# Patient Record
Sex: FEMALE | Race: WHITE | NOT HISPANIC OR LATINO | Employment: FULL TIME | URBAN - METROPOLITAN AREA
[De-identification: names, ages, dates, MRNs, and addresses within clinical notes are randomized per-mention and may not be internally consistent; named-entity substitution may affect disease eponyms.]

---

## 2017-07-11 ENCOUNTER — OFFICE VISIT (OUTPATIENT)
Dept: OBSTETRICS AND GYNECOLOGY | Facility: CLINIC | Age: 62
End: 2017-07-11
Payer: COMMERCIAL

## 2017-07-11 VITALS
SYSTOLIC BLOOD PRESSURE: 102 MMHG | DIASTOLIC BLOOD PRESSURE: 60 MMHG | HEIGHT: 61 IN | BODY MASS INDEX: 20.11 KG/M2 | WEIGHT: 106.5 LBS

## 2017-07-11 DIAGNOSIS — R87.615 UNSATISFACTORY CERVICAL CYTOLOGY SMEAR: ICD-10-CM

## 2017-07-11 DIAGNOSIS — Z01.419 WELL FEMALE EXAM WITH ROUTINE GYNECOLOGICAL EXAM: Primary | ICD-10-CM

## 2017-07-11 DIAGNOSIS — Z12.31 VISIT FOR SCREENING MAMMOGRAM: ICD-10-CM

## 2017-07-11 DIAGNOSIS — Z11.51 SCREENING FOR HUMAN PAPILLOMAVIRUS: ICD-10-CM

## 2017-07-11 PROCEDURE — 99999 PR PBB SHADOW E&M-EST. PATIENT-LVL III: CPT | Mod: PBBFAC,,, | Performed by: OBSTETRICS & GYNECOLOGY

## 2017-07-11 PROCEDURE — 88142 CYTOPATH C/V THIN LAYER: CPT

## 2017-07-11 PROCEDURE — 99396 PREV VISIT EST AGE 40-64: CPT | Mod: S$GLB,,, | Performed by: OBSTETRICS & GYNECOLOGY

## 2017-07-11 PROCEDURE — 87624 HPV HI-RISK TYP POOLED RSLT: CPT

## 2017-07-11 NOTE — PROGRESS NOTES
"CC: Well woman exam    Nika Smith is a 62 y.o. female  presents for a well woman exam.  She is established.      HPI:  62 year old here for WWE.  Patient denies vaginal bleeding, breast concerns, no pelvic pain.   Repeat pap today.     Past Medical History:   Diagnosis Date    Abnormal Pap smear of cervix     ASCUS favor reactive, repeat nl    History of osteopenia     hip    Insomnia     Menopause        Past Surgical History:   Procedure Laterality Date    DILATION AND CURETTAGE OF UTERUS      menorrhagia, path benign       OB History    Para Term  AB Living   2 2       2   SAB TAB Ectopic Multiple Live Births           2      # Outcome Date GA Lbr Jordi/2nd Weight Sex Delivery Anes PTL Lv   2 Para     M Vag-Spont   FRANCY   1 Para    3.544 kg (7 lb 13 oz) M Vag-Spont   FRANCY          Family History   Problem Relation Age of Onset    Diabetes Father     Hypertension Father     Heart attack Father     Coronary artery disease Father     Breast cancer Sister 65       Social History   Substance Use Topics    Smoking status: Former Smoker    Smokeless tobacco: Former User    Alcohol use Yes       /60   Ht 5' 0.5" (1.537 m)   Wt 48.3 kg (106 lb 7.7 oz)   LMP  (LMP Unknown)   BMI 20.45 kg/m²     ROS:  Review of Systems   Constitutional: Negative for fatigue.   Respiratory: Negative for shortness of breath.    Cardiovascular: Negative for chest pain.   Gastrointestinal: Negative for abdominal pain, constipation, diarrhea and nausea.   Genitourinary: Negative for dyspareunia, dysuria, menstrual problem, pelvic pain and vaginal bleeding.   Musculoskeletal: Negative for back pain.   Skin: Negative for rash.   Neurological: Negative for headaches.   Psychiatric/Behavioral: Negative for dysphoric mood. The patient is not nervous/anxious.        Physical Exam:  Physical Exam:   Constitutional: She is oriented to person, place, and time. She appears well-developed and " well-nourished.      Neck: No thyromegaly present.    Cardiovascular: Normal rate.     Pulmonary/Chest: Effort normal and breath sounds normal. Right breast exhibits no mass, no nipple discharge, no skin change, no tenderness and no bleeding. Left breast exhibits no mass, no nipple discharge, no skin change, no tenderness and no bleeding.        Abdominal: Soft. Normal appearance and bowel sounds are normal. She exhibits no distension and no mass. There is no tenderness. There is no rebound and no guarding.     Genitourinary: Vagina normal and uterus normal. Pelvic exam was performed with patient supine. There is no rash, tenderness, lesion or injury on the right labia. There is no rash, tenderness, lesion or injury on the left labia. Uterus is not enlarged, not fixed and not tender. Cervix is normal. Right adnexum displays no mass, no tenderness and no fullness. Left adnexum displays no mass, no tenderness and no fullness. No erythema, tenderness, rectocele, cystocele or unspecified prolapse of vaginal walls in the vagina. No signs of injury around the vagina. No vaginal discharge found. Cervix exhibits no motion tenderness, no discharge and no friability.           Musculoskeletal: Normal range of motion and moves all extremeties.      Lymphadenopathy:     She has no axillary adenopathy.        Right: No supraclavicular adenopathy present.        Left: No supraclavicular adenopathy present.    Neurological: She is alert and oriented to person, place, and time.    Skin: Skin is warm and dry.    Psychiatric: She has a normal mood and affect. Her behavior is normal. Judgment normal.       ASSESSMENT AND PLAN  Well female exam with routine gynecological exam  -     Liquid-based pap smear, screening  -     HPV High Risk Genotypes, PCR    Visit for screening mammogram  -     Mammo Digital Screening Bilat with Tomosynthesis CAD; Future; Expected date: 07/11/2017    Screening for human papillomavirus  -     HPV High Risk  Genotypes, PCR    Unsatisfactory cervical cytology smear  -     HPV High Risk Genotypes, PCR        Patient was counseled today on A.C.S. Pap guidelines and recommendations for yearly pelvic exams, mammograms and monthly self breast exams; to see her PCP for other health maintenance.     Return in about 1 year (around 7/11/2018).

## 2017-07-17 LAB
HPV HR 12 DNA CVX QL NAA+PROBE: NEGATIVE
HPV16 DNA SPEC QL NAA+PROBE: NEGATIVE
HPV18 DNA SPEC QL NAA+PROBE: NEGATIVE

## 2018-07-10 ENCOUNTER — OFFICE VISIT (OUTPATIENT)
Dept: OBSTETRICS AND GYNECOLOGY | Facility: CLINIC | Age: 63
End: 2018-07-10
Payer: MEDICARE

## 2018-07-10 VITALS
WEIGHT: 103.75 LBS | SYSTOLIC BLOOD PRESSURE: 110 MMHG | BODY MASS INDEX: 17.29 KG/M2 | DIASTOLIC BLOOD PRESSURE: 68 MMHG | HEIGHT: 65 IN

## 2018-07-10 DIAGNOSIS — M89.9 DISORDER OF BONE: ICD-10-CM

## 2018-07-10 DIAGNOSIS — Z12.31 VISIT FOR SCREENING MAMMOGRAM: Primary | ICD-10-CM

## 2018-07-10 PROCEDURE — 99212 OFFICE O/P EST SF 10 MIN: CPT | Mod: S$GLB,,, | Performed by: OBSTETRICS & GYNECOLOGY

## 2018-07-10 PROCEDURE — 3008F BODY MASS INDEX DOCD: CPT | Mod: CPTII,S$GLB,, | Performed by: OBSTETRICS & GYNECOLOGY

## 2018-07-10 PROCEDURE — 99999 PR PBB SHADOW E&M-EST. PATIENT-LVL III: CPT | Mod: PBBFAC,,, | Performed by: OBSTETRICS & GYNECOLOGY

## 2018-07-10 RX ORDER — TRAZODONE HYDROCHLORIDE 50 MG/1
TABLET ORAL
COMMUNITY
Start: 2018-06-13 | End: 2019-07-16

## 2018-07-10 RX ORDER — MINOXIDIL 50 MG/G
AEROSOL, FOAM TOPICAL
COMMUNITY
Start: 2018-07-08

## 2018-07-10 RX ORDER — ZOLPIDEM TARTRATE 10 MG/1
TABLET ORAL
COMMUNITY
Start: 2018-06-10 | End: 2019-10-21 | Stop reason: SDUPTHER

## 2018-07-16 NOTE — PROGRESS NOTES
Subjective:       Patient ID: Nika Smith is a 63 y.o. female.    Chief Complaint:  Well Woman (17 pap/hpv-negative/negative 2016-Bone Density)      History of Present Illness  63 year old here to discuss osteopenia and bone density.  Last scan 2016 with major 17% and hip1. 3% for a fracture with her osteopenia.  Due for a repeat bone density.  No breast concerns.  UTD with mammogram.  No vaginal bleeding or pelvic pain.  Occasional stress incontinence.  New pap guidelines discussed.    GYN & OB History  No LMP recorded (lmp unknown). Patient is postmenopausal.   Date of Last Pap: 7/15/2017    OB History    Para Term  AB Living   2 2       2   SAB TAB Ectopic Multiple Live Births           2      # Outcome Date GA Lbr Jordi/2nd Weight Sex Delivery Anes PTL Lv   2 Para     M Vag-Spont   FRANCY   1 Para    3.544 kg (7 lb 13 oz) M Vag-Spont   FRANCY          Past Medical History:   Diagnosis Date    Abnormal Pap smear of cervix     ASCUS favor reactive, repeat nl    History of osteopenia     hip    Insomnia     Menopause     Mitral valve prolapse        Past Surgical History:   Procedure Laterality Date    DILATION AND CURETTAGE OF UTERUS  2002    menorrhagia, path benign       Review of Systems  Review of Systems   Constitutional: Negative for fatigue.   Respiratory: Negative for shortness of breath.    Cardiovascular: Negative for chest pain.   Gastrointestinal: Negative for abdominal pain, constipation, diarrhea and nausea.   Genitourinary: Negative for dysuria, pelvic pain and vaginal bleeding.        Mild stress incontinence   Musculoskeletal: Negative for back pain.   Skin: Negative for rash.   Neurological: Negative for headaches.   Hematological: Negative for adenopathy.   Psychiatric/Behavioral: Negative for dysphoric mood. The patient is not nervous/anxious.         Objective:   Physical Exam:   Constitutional: She is oriented to person, place, and time. She appears  well-developed and well-nourished.      Neck: No thyromegaly present.    Cardiovascular: Normal rate.     Pulmonary/Chest: Effort normal and breath sounds normal. Right breast exhibits no mass, no nipple discharge, no skin change, no tenderness and no bleeding. Left breast exhibits no mass, no nipple discharge, no skin change, no tenderness and no bleeding.        Abdominal: Soft. Normal appearance and bowel sounds are normal. She exhibits no distension and no mass. There is no tenderness. There is no rebound and no guarding.     Genitourinary: Vagina normal and uterus normal. Pelvic exam was performed with patient supine. There is no rash, tenderness, lesion or injury on the right labia. There is no rash, tenderness, lesion or injury on the left labia. Uterus is not enlarged, not fixed and not tender. Cervix is normal. Right adnexum displays no mass, no tenderness and no fullness. Left adnexum displays no mass, no tenderness and no fullness. No erythema, tenderness, rectocele, cystocele or unspecified prolapse of vaginal walls in the vagina. No signs of injury around the vagina. No vaginal discharge found. Cervix exhibits no motion tenderness, no discharge and no friability.           Musculoskeletal: Normal range of motion and moves all extremeties.      Lymphadenopathy:     She has no axillary adenopathy.        Right: No supraclavicular adenopathy present.        Left: No supraclavicular adenopathy present.    Neurological: She is alert and oriented to person, place, and time.    Skin: Skin is warm and dry.    Psychiatric: She has a normal mood and affect. Her behavior is normal. Judgment normal.        Assessment/ Plan:     Visit for screening mammogram  -     Mammo Digital Screening Bilat with Tomosynthesis CAD; Future; Expected date: 07/10/2018    Disorder of bone  -     DXA Bone Density Spine And Hip; Future; Expected date: 07/10/2018         No Follow-up on file.    Patient was counseled today on A.C.S. Pap  guidelines and recommendations for yearly pelvic exams, mammograms and monthly self breast exams; to see her PCP for other health maintenance.

## 2018-08-14 ENCOUNTER — TELEPHONE (OUTPATIENT)
Dept: OBSTETRICS AND GYNECOLOGY | Facility: CLINIC | Age: 63
End: 2018-08-14

## 2018-08-14 NOTE — TELEPHONE ENCOUNTER
Pt calling to discuss her bone density results with Dr Bedoya. Pt also wants to know if Dr Bedoya found out about her sleeping medication options.

## 2018-08-16 ENCOUNTER — PATIENT MESSAGE (OUTPATIENT)
Dept: OBSTETRICS AND GYNECOLOGY | Facility: CLINIC | Age: 63
End: 2018-08-16

## 2018-08-20 ENCOUNTER — TELEPHONE (OUTPATIENT)
Dept: OBSTETRICS AND GYNECOLOGY | Facility: CLINIC | Age: 63
End: 2018-08-20

## 2018-08-20 NOTE — TELEPHONE ENCOUNTER
Called and discussed the option for progesterone at night to help with sleep   Patient to call back if interested

## 2018-08-29 ENCOUNTER — TELEPHONE (OUTPATIENT)
Dept: OBSTETRICS AND GYNECOLOGY | Facility: CLINIC | Age: 63
End: 2018-08-29

## 2019-07-16 ENCOUNTER — OFFICE VISIT (OUTPATIENT)
Dept: OBSTETRICS AND GYNECOLOGY | Facility: CLINIC | Age: 64
End: 2019-07-16
Payer: MEDICARE

## 2019-07-16 VITALS
WEIGHT: 103.38 LBS | SYSTOLIC BLOOD PRESSURE: 110 MMHG | DIASTOLIC BLOOD PRESSURE: 60 MMHG | HEIGHT: 60 IN | BODY MASS INDEX: 20.3 KG/M2

## 2019-07-16 DIAGNOSIS — N95.1 HOT FLASHES DUE TO MENOPAUSE: Primary | ICD-10-CM

## 2019-07-16 DIAGNOSIS — Z12.31 VISIT FOR SCREENING MAMMOGRAM: ICD-10-CM

## 2019-07-16 PROCEDURE — 99214 PR OFFICE/OUTPT VISIT, EST, LEVL IV, 30-39 MIN: ICD-10-PCS | Mod: S$GLB,,, | Performed by: OBSTETRICS & GYNECOLOGY

## 2019-07-16 PROCEDURE — 99999 PR PBB SHADOW E&M-EST. PATIENT-LVL III: CPT | Mod: PBBFAC,,, | Performed by: OBSTETRICS & GYNECOLOGY

## 2019-07-16 PROCEDURE — 99214 OFFICE O/P EST MOD 30 MIN: CPT | Mod: S$GLB,,, | Performed by: OBSTETRICS & GYNECOLOGY

## 2019-07-16 PROCEDURE — 99999 PR PBB SHADOW E&M-EST. PATIENT-LVL III: ICD-10-PCS | Mod: PBBFAC,,, | Performed by: OBSTETRICS & GYNECOLOGY

## 2019-07-16 RX ORDER — SERTRALINE HYDROCHLORIDE 25 MG/1
TABLET, FILM COATED ORAL
COMMUNITY
Start: 2019-05-20 | End: 2019-10-21

## 2019-07-16 RX ORDER — ESTRADIOL 1 MG/1
1 TABLET ORAL DAILY
Qty: 30 TABLET | Refills: 11 | Status: SHIPPED | OUTPATIENT
Start: 2019-07-16 | End: 2019-10-21

## 2019-07-16 RX ORDER — ALPRAZOLAM 0.5 MG/1
TABLET ORAL
COMMUNITY
Start: 2019-05-17 | End: 2019-10-21

## 2019-07-16 RX ORDER — PROGESTERONE 100 MG/1
100 CAPSULE ORAL NIGHTLY
Qty: 30 CAPSULE | Refills: 11 | Status: SHIPPED | OUTPATIENT
Start: 2019-07-16 | End: 2019-10-21

## 2019-07-31 NOTE — PROGRESS NOTES
Subjective:       Patient ID: Nika Smith is a 64 y.o. female.    Chief Complaint:  Discuss hormones and bone density     History of Present Illness  64 year old here for discussion about hormones and postmenopausal symptoms.  Patient reports no pelvic pain or vaginal bleeding.  No breast concerns. UTD with mammogram.  Last bone density reviewed.  Dicussed trial of hormones for sleeping, moods and occasional hot flashes.  Discussed sleep medication and alternatives for helping with insomnia.    GYN & OB History  No LMP recorded (lmp unknown). Patient is postmenopausal.   Date of Last Pap: 7/15/2017    OB History    Para Term  AB Living   2 2       2   SAB TAB Ectopic Multiple Live Births           2      # Outcome Date GA Lbr Jordi/2nd Weight Sex Delivery Anes PTL Lv   2 Para     M Vag-Spont   FRANCY   1 Para    3.544 kg (7 lb 13 oz) M Vag-Spont   FRANCY       Past Medical History:   Diagnosis Date    Abnormal Pap smear of cervix     ASCUS favor reactive, repeat nl    History of osteopenia     hip    Insomnia     Menopause     Mitral valve prolapse        Past Surgical History:   Procedure Laterality Date    DILATION AND CURETTAGE OF UTERUS      menorrhagia, path benign       Review of Systems  Review of Systems   Constitutional: Negative for fatigue.   Respiratory: Negative for shortness of breath.    Cardiovascular: Negative for chest pain.   Gastrointestinal: Negative for abdominal pain, constipation, diarrhea and nausea.   Endocrine: Positive for heat intolerance.   Genitourinary: Negative for dyspareunia, dysuria, pelvic pain and vaginal bleeding.   Musculoskeletal: Negative for back pain.   Skin: Negative for rash.   Neurological: Negative for headaches.   Hematological: Negative for adenopathy.   Psychiatric/Behavioral: Positive for sleep disturbance. Negative for dysphoric mood. The patient is nervous/anxious.         Objective:   Physical Exam:   Constitutional: She is  oriented to person, place, and time. She appears well-developed and well-nourished.      Neck: No thyromegaly present.     Pulmonary/Chest: Effort normal. Right breast exhibits no mass, no nipple discharge, no skin change, no tenderness and no bleeding. Left breast exhibits no mass, no nipple discharge, no skin change, no tenderness and no bleeding.        Abdominal: Soft. Normal appearance and bowel sounds are normal. She exhibits no distension and no mass. There is no tenderness. There is no rebound and no guarding.     Genitourinary: Vagina normal and uterus normal. Pelvic exam was performed with patient supine. There is no rash, tenderness, lesion or injury on the right labia. There is no rash, tenderness, lesion or injury on the left labia. Uterus is not enlarged, not fixed and not tender. Cervix is normal. Right adnexum displays no mass, no tenderness and no fullness. Left adnexum displays no mass, no tenderness and no fullness. No erythema, tenderness, rectocele, cystocele or unspecified prolapse of vaginal walls in the vagina. No signs of injury around the vagina. No vaginal discharge found. Cervix exhibits no motion tenderness, no discharge and no friability.           Musculoskeletal: Normal range of motion and moves all extremeties.      Lymphadenopathy:     She has no axillary adenopathy.        Right: No supraclavicular adenopathy present.        Left: No supraclavicular adenopathy present.    Neurological: She is alert and oriented to person, place, and time.    Skin: Skin is warm and dry.    Psychiatric: She has a normal mood and affect. Her behavior is normal. Judgment normal.        Assessment/ Plan:     Hot flashes due to menopause    Visit for screening mammogram  -     Mammo Digital Screening [facility]/ Tom; Future; Expected date: 07/16/2019    Other orders  -     progesterone (PROMETRIUM) 100 MG capsule; Take 1 capsule (100 mg total) by mouth nightly.  Dispense: 30 capsule; Refill: 11 -      estradiol (ESTRACE) 1 MG tablet; Take 1 tablet (1 mg total) by mouth once daily.  Dispense: 30 tablet; Refill: 11    discussed follow up with Dr. Gonzales   Reviewed supplement for sleep aid     No follow-ups on file.    Patient was counseled today on A.C.S. Pap guidelines and recommendations for yearly pelvic exams, mammograms and monthly self breast exams; to see her PCP for other health maintenance.

## 2019-08-13 ENCOUNTER — PATIENT MESSAGE (OUTPATIENT)
Dept: OBSTETRICS AND GYNECOLOGY | Facility: CLINIC | Age: 64
End: 2019-08-13

## 2019-10-20 PROBLEM — I34.1 MITRAL VALVE PROLAPSE SYNDROME: Status: ACTIVE | Noted: 2017-10-12

## 2019-10-20 PROBLEM — F41.9 ANXIETY: Status: ACTIVE | Noted: 2019-05-21

## 2019-10-20 PROBLEM — M15.9 PRIMARY OSTEOARTHRITIS INVOLVING MULTIPLE JOINTS: Status: ACTIVE | Noted: 2017-10-12

## 2019-10-20 PROBLEM — F51.01 PRIMARY INSOMNIA: Status: ACTIVE | Noted: 2018-02-05

## 2019-10-20 PROBLEM — M15.0 PRIMARY OSTEOARTHRITIS INVOLVING MULTIPLE JOINTS: Status: ACTIVE | Noted: 2017-10-12

## 2019-10-21 ENCOUNTER — OFFICE VISIT (OUTPATIENT)
Dept: FAMILY MEDICINE | Facility: CLINIC | Age: 64
End: 2019-10-21
Payer: MEDICARE

## 2019-10-21 VITALS
WEIGHT: 103 LBS | DIASTOLIC BLOOD PRESSURE: 70 MMHG | HEART RATE: 60 BPM | SYSTOLIC BLOOD PRESSURE: 120 MMHG | BODY MASS INDEX: 20.22 KG/M2 | HEIGHT: 60 IN

## 2019-10-21 DIAGNOSIS — F51.01 PRIMARY INSOMNIA: ICD-10-CM

## 2019-10-21 DIAGNOSIS — M85.851 OSTEOPENIA OF NECKS OF BOTH FEMURS: ICD-10-CM

## 2019-10-21 DIAGNOSIS — H61.23 BILATERAL HEARING LOSS DUE TO CERUMEN IMPACTION: ICD-10-CM

## 2019-10-21 DIAGNOSIS — M15.9 PRIMARY OSTEOARTHRITIS INVOLVING MULTIPLE JOINTS: ICD-10-CM

## 2019-10-21 DIAGNOSIS — Z79.899 ENCOUNTER FOR LONG-TERM (CURRENT) USE OF MEDICATIONS: ICD-10-CM

## 2019-10-21 DIAGNOSIS — F41.9 ANXIETY: Primary | ICD-10-CM

## 2019-10-21 DIAGNOSIS — I34.1 MITRAL VALVE PROLAPSE SYNDROME: ICD-10-CM

## 2019-10-21 DIAGNOSIS — M85.852 OSTEOPENIA OF NECKS OF BOTH FEMURS: ICD-10-CM

## 2019-10-21 PROCEDURE — 69209 REMOVE IMPACTED EAR WAX UNI: CPT | Mod: 50,S$GLB,, | Performed by: FAMILY MEDICINE

## 2019-10-21 PROCEDURE — 99214 OFFICE O/P EST MOD 30 MIN: CPT | Mod: 25,S$GLB,, | Performed by: FAMILY MEDICINE

## 2019-10-21 PROCEDURE — 69209 EAR CERUMEN REMOVAL: ICD-10-PCS | Mod: 50,S$GLB,, | Performed by: FAMILY MEDICINE

## 2019-10-21 PROCEDURE — 1101F PR PT FALLS ASSESS DOC 0-1 FALLS W/OUT INJ PAST YR: ICD-10-PCS | Mod: S$GLB,,, | Performed by: FAMILY MEDICINE

## 2019-10-21 PROCEDURE — 1101F PT FALLS ASSESS-DOCD LE1/YR: CPT | Mod: S$GLB,,, | Performed by: FAMILY MEDICINE

## 2019-10-21 PROCEDURE — 99214 PR OFFICE/OUTPT VISIT, EST, LEVL IV, 30-39 MIN: ICD-10-PCS | Mod: 25,S$GLB,, | Performed by: FAMILY MEDICINE

## 2019-10-21 RX ORDER — ZOLPIDEM TARTRATE 10 MG/1
10 TABLET ORAL NIGHTLY
Qty: 30 TABLET | Refills: 2 | Status: SHIPPED | OUTPATIENT
Start: 2019-10-21 | End: 2020-02-17 | Stop reason: SDUPTHER

## 2019-10-21 RX ORDER — PRENATAL VIT CALC,IRON,FOLIC
3 TABLET ORAL DAILY
COMMUNITY

## 2019-10-21 NOTE — PATIENT INSTRUCTIONS

## 2019-10-21 NOTE — PROCEDURES
"Ear Cerumen Removal  Date/Time: 10/21/2019 8:20 AM  Performed by: Celio Birmingham MD  Authorized by: Celio Birmingham MD     Time out: Immediately prior to procedure a "time out" was called to verify the correct patient, procedure, equipment, support staff and site/side marked as required.    Consent Done?:  Yes (Verbal)  Location details:  Both ears  Procedure type: irrigation    Cerumen  Removal Results:  Cerumen completely removed  Patient tolerance:  Patient tolerated the procedure well with no immediate complications      "

## 2019-10-21 NOTE — PROGRESS NOTES
SUBJECTIVE:    Patient ID: Nika Smith is a 64 y.o. female.    Chief Complaint: Annual Exam (ac)     This 64-year-old female is very athletic.  She rides a bike 45 min or does spin class 1-2 times per week she does do yoga classes in the evening.  She can use an elliptical machine 45 min and exercises almost 5-7 days a week.  She does admit to some fatigue in the afternoon where she will lay down and take a rest.  She drinks wine only at night.  Does not smoke    She sees  Gyn in [place] for Pap smears and DEXA scans and mammograms.  She had a flu shot already this month.      No visits with results within 6 Month(s) from this visit.   Latest known visit with results is:   Office Visit on 07/11/2017   Component Date Value Ref Range Status    HPV High Risk type 16, PCR 07/11/2017 Negative  Negative Final    HPV High Risk type 18, PCR 07/11/2017 Negative  Negative Final    HPV other High Risk types, PCR 07/11/2017 Negative  Negative Final       Past Medical History:   Diagnosis Date    Abnormal Pap smear of cervix 1989    ASCUS favor reactive, repeat nl    History of osteopenia     hip    Insomnia     Menopause 2005    Mitral valve prolapse      Past Surgical History:   Procedure Laterality Date    COLONOSCOPY  2015    Dr Malhotra-[facility] 5 yr    DILATION AND CURETTAGE OF UTERUS  2002    menorrhagia, path benign    EYE SURGERY      Cataracts-Dr Westbrook     Family History   Problem Relation Age of Onset    Diabetes Father     Hypertension Father     Heart attack Father     Coronary artery disease Father     Breast cancer Sister 65       Marital Status:   Alcohol History:  reports that she drinks alcohol.  Tobacco History:  reports that she has quit smoking. She has quit using smokeless tobacco.  Drug History:  reports that she does not use drugs.    Review of patient's allergies indicates:  No Known Allergies    Current Outpatient Medications:     calcium citrate-vitamin D3 200 mg  calcium -250 unit Tab, Take 3 tablets by mouth once daily., Disp: , Rfl:     EYE ITCH RELIEF 0.025 % (0.035 %) ophthalmic solution, , Disp: , Rfl:     FLAXSEED OIL MISC, , Disp: , Rfl:     glucosam-MSM-chond-hyaluron ac (GLUCOSAMINE-CHONDROITIN) 223--1 mg Tab, Take 2 tablets by mouth once daily., Disp: , Rfl:     glucosam/chond-msm1/C/gail/bor (GLUCOSAMINE-CHOND-MSM COMPLEX ORAL), , Disp: , Rfl:     mineral oil/petrolatum,white (LUBRICANT EYE OPHT), , Disp: , Rfl:     multivit with minerals/lutein (MULTIVITAMIN 50 PLUS ORAL), , Disp: , Rfl:     zolpidem (AMBIEN) 10 mg Tab, Take 1 tablet (10 mg total) by mouth every evening., Disp: 30 tablet, Rfl: 2    Review of Systems   Constitutional: Negative for appetite change, chills, fatigue, fever and unexpected weight change.   HENT: Negative for congestion, ear pain, sinus pain, sore throat and trouble swallowing.    Eyes: Negative for pain, discharge and visual disturbance.   Respiratory: Negative for apnea, cough, shortness of breath and wheezing.    Cardiovascular: Negative for chest pain, palpitations and leg swelling.   Gastrointestinal: Negative for abdominal pain, blood in stool, constipation, diarrhea, nausea and vomiting.   Endocrine: Negative for heat intolerance, polydipsia and polyuria.   Genitourinary: Negative for difficulty urinating, dyspareunia, dysuria, frequency, hematuria and menstrual problem.   Musculoskeletal: Negative for arthralgias, back pain, gait problem, joint swelling (DIP joint deformities , not painful) and myalgias.   Allergic/Immunologic: Negative for environmental allergies, food allergies and immunocompromised state.   Neurological: Negative for dizziness, tremors, seizures, numbness and headaches.   Psychiatric/Behavioral: Positive for sleep disturbance (zolpidem helps  get me 4-5 hrs of sleep). Negative for behavioral problems, confusion, hallucinations and suicidal ideas. The patient is not nervous/anxious.            Objective:      Vitals:    10/21/19 0820   BP: 120/70   Pulse: 60   Weight: 46.7 kg (103 lb)   Height: 5' (1.524 m)     Body mass index is 20.12 kg/m².  Physical Exam   Constitutional: She is oriented to person, place, and time. She appears well-developed and well-nourished.   Thin wf   HENT:   Head: Normocephalic and atraumatic.   Nose: Nose normal.   Mouth/Throat: Oropharynx is clear and moist.   Bilateral cerumen impactions   Eyes: Pupils are equal, round, and reactive to light. EOM are normal.   Neck: Normal range of motion. Neck supple. Carotid bruit is not present. No thyromegaly present.   Cardiovascular: Normal rate, regular rhythm, normal heart sounds and intact distal pulses.   No murmur heard.  Pulmonary/Chest: Effort normal and breath sounds normal. She has no wheezes. She has no rales.   Abdominal: Soft. Bowel sounds are normal. She exhibits no distension. There is no hepatosplenomegaly. There is no tenderness.   Musculoskeletal: Normal range of motion. She exhibits no tenderness or deformity.        Lumbar back: Normal. She exhibits no pain and no spasm.   Bends 90 degrees at  Waist/ knees full range of motion, shoulders full range of motion, left tibial bony prominence post fracture years ago   Lymphadenopathy:     She has no cervical adenopathy.   Neurological: She is alert and oriented to person, place, and time. No cranial nerve deficit. Coordination normal.   Skin: Skin is warm and dry. No rash noted.   Psychiatric: She has a normal mood and affect. Her behavior is normal. Judgment and thought content normal.   Nursing note and vitals reviewed.        Assessment:       1. Anxiety    2. Mitral valve prolapse syndrome    3. Primary osteoarthritis involving multiple joints    4. Osteopenia of necks of both femurs    5. Primary insomnia    6. Bilateral hearing loss due to cerumen impaction    7. Encounter for long-term (current) use of medications         Plan:       Anxiety  Managing well  Mitral  valve prolapse syndrome  At this time., asymptomatic  Primary osteoarthritis involving multiple joints  Glucosamine helps quite a bit  Osteopenia of necks of both femurs  Continue weight-bearing exercise, she was intolerant of estrogen hormones  Primary insomnia  -     zolpidem (AMBIEN) 10 mg Tab; Take 1 tablet (10 mg total) by mouth every evening.  Dispense: 30 tablet; Refill: 2  Refill Ambien  Bilateral hearing loss due to cerumen impactionIrrigate bilateral ears  Encounter for long-term (current) use of medications  -     CBC auto differential; Future; Expected date: 10/21/2019  -     Comprehensive metabolic panel; Future; Expected date: 10/21/2019  -     Lipid panel; Future; Expected date: 10/21/2019  Labs due today    No follow-ups on file.

## 2019-10-22 LAB
ALBUMIN SERPL-MCNC: 4.4 G/DL (ref 3.6–5.1)
ALBUMIN/GLOB SERPL: 1.7 (CALC) (ref 1–2.5)
ALP SERPL-CCNC: 73 U/L (ref 33–130)
ALT SERPL-CCNC: 16 U/L (ref 6–29)
AST SERPL-CCNC: 21 U/L (ref 10–35)
BASOPHILS # BLD AUTO: 39 CELLS/UL (ref 0–200)
BASOPHILS NFR BLD AUTO: 0.7 %
BILIRUB SERPL-MCNC: 0.6 MG/DL (ref 0.2–1.2)
BUN SERPL-MCNC: 12 MG/DL (ref 7–25)
BUN/CREAT SERPL: NORMAL (CALC) (ref 6–22)
CALCIUM SERPL-MCNC: 9.9 MG/DL (ref 8.6–10.4)
CHLORIDE SERPL-SCNC: 102 MMOL/L (ref 98–110)
CHOLEST SERPL-MCNC: 248 MG/DL
CHOLEST/HDLC SERPL: 2.8 (CALC)
CO2 SERPL-SCNC: 30 MMOL/L (ref 20–32)
CREAT SERPL-MCNC: 0.71 MG/DL (ref 0.5–0.99)
EOSINOPHIL # BLD AUTO: 39 CELLS/UL (ref 15–500)
EOSINOPHIL NFR BLD AUTO: 0.7 %
ERYTHROCYTE [DISTWIDTH] IN BLOOD BY AUTOMATED COUNT: 12.5 % (ref 11–15)
GFRSERPLBLD MDRD-ARVRAT: 89 ML/MIN/1.73M2
GLOBULIN SER CALC-MCNC: 2.6 G/DL (CALC) (ref 1.9–3.7)
GLUCOSE SERPL-MCNC: 92 MG/DL (ref 65–139)
HCT VFR BLD AUTO: 42.6 % (ref 35–45)
HDLC SERPL-MCNC: 88 MG/DL
HGB BLD-MCNC: 14.1 G/DL (ref 11.7–15.5)
LDLC SERPL CALC-MCNC: 125 MG/DL (CALC)
LYMPHOCYTES # BLD AUTO: 1551 CELLS/UL (ref 850–3900)
LYMPHOCYTES NFR BLD AUTO: 27.7 %
MCH RBC QN AUTO: 30.5 PG (ref 27–33)
MCHC RBC AUTO-ENTMCNC: 33.1 G/DL (ref 32–36)
MCV RBC AUTO: 92 FL (ref 80–100)
MONOCYTES # BLD AUTO: 353 CELLS/UL (ref 200–950)
MONOCYTES NFR BLD AUTO: 6.3 %
NEUTROPHILS # BLD AUTO: 3618 CELLS/UL (ref 1500–7800)
NEUTROPHILS NFR BLD AUTO: 64.6 %
NONHDLC SERPL-MCNC: 160 MG/DL (CALC)
PLATELET # BLD AUTO: 178 THOUSAND/UL (ref 140–400)
PMV BLD REES-ECKER: 12.2 FL (ref 7.5–12.5)
POTASSIUM SERPL-SCNC: 4.5 MMOL/L (ref 3.5–5.3)
PROT SERPL-MCNC: 7 G/DL (ref 6.1–8.1)
RBC # BLD AUTO: 4.63 MILLION/UL (ref 3.8–5.1)
SODIUM SERPL-SCNC: 140 MMOL/L (ref 135–146)
TRIGL SERPL-MCNC: 208 MG/DL
WBC # BLD AUTO: 5.6 THOUSAND/UL (ref 3.8–10.8)

## 2019-10-28 ENCOUNTER — TELEPHONE (OUTPATIENT)
Dept: FAMILY MEDICINE | Facility: CLINIC | Age: 64
End: 2019-10-28

## 2019-10-28 NOTE — TELEPHONE ENCOUNTER
Cholesterol is elevated up to 248.  Triglycerides 208.  HDL (good cholesterol) is high at 88.  LDL (bad cholesterol) also elevated at 125.  Follow a low-fat diet and reduce fried foods in her diet.  We will recheck these lipids in 6 months, if they are not better we may have to use a prescription medication.Lipids in 6  mo     10/28-spoke to pt.  Remind Me created/ba

## 2019-11-05 DIAGNOSIS — F41.9 ANXIETY: Primary | ICD-10-CM

## 2019-11-05 NOTE — TELEPHONE ENCOUNTER
----- Message from Aarti Talamantes sent at 11/5/2019  2:03 PM CST -----  Pt wants an Rx for anxiety. Brad prescribed Zoloft 10 mg but that did not make the patient feel better so she took herself off of it. She has taken Lexapro in the past without any symptoms or issues? [facility] on [address]. Pt #[phone]

## 2019-11-06 RX ORDER — ESCITALOPRAM OXALATE 10 MG/1
10 TABLET ORAL DAILY
Qty: 30 TABLET | Refills: 3 | Status: SHIPPED | OUTPATIENT
Start: 2019-11-06 | End: 2020-04-21 | Stop reason: SDUPTHER

## 2019-11-06 NOTE — TELEPHONE ENCOUNTER
"Per Dr. Birmingham, "Lexapro 10mg 1 po q am #30 3 refill." Spoke with pt and let her know the above.   "

## 2020-02-17 DIAGNOSIS — F51.01 PRIMARY INSOMNIA: ICD-10-CM

## 2020-02-17 RX ORDER — ZOLPIDEM TARTRATE 10 MG/1
10 TABLET ORAL NIGHTLY
Qty: 30 TABLET | Refills: 2 | Status: SHIPPED | OUTPATIENT
Start: 2020-02-17 | End: 2020-04-30 | Stop reason: SDUPTHER

## 2020-04-21 DIAGNOSIS — F41.9 ANXIETY: ICD-10-CM

## 2020-04-21 RX ORDER — ESCITALOPRAM OXALATE 10 MG/1
10 TABLET ORAL DAILY
Qty: 30 TABLET | Refills: 3 | Status: SHIPPED | OUTPATIENT
Start: 2020-04-21 | End: 2020-08-25 | Stop reason: SDUPTHER

## 2020-04-28 ENCOUNTER — TELEPHONE (OUTPATIENT)
Dept: FAMILY MEDICINE | Facility: CLINIC | Age: 65
End: 2020-04-28

## 2020-04-28 DIAGNOSIS — Z79.899 ENCOUNTER FOR LONG-TERM (CURRENT) USE OF MEDICATIONS: Primary | ICD-10-CM

## 2020-04-28 DIAGNOSIS — E78.00 ELEVATED CHOLESTEROL: ICD-10-CM

## 2020-04-28 NOTE — TELEPHONE ENCOUNTER
----- Message from Caridad Whalen sent at 10/28/2019 12:09 PM CDT -----  Cholesterol is elevated up to 248.  Triglycerides 208.  HDL (good cholesterol) is high at 88.  LDL (bad cholesterol) also elevated at 125.  Follow a low-fat diet and reduce fried foods in her diet.  We will recheck these lipids in 6 months, if they are not better we may have to use a prescription medication.Lipids in 6  mo

## 2020-04-29 NOTE — TELEPHONE ENCOUNTER
Patient called back. States she would like to wait until maybe late May or June to get lab drawn. She will call our office when she is ready to see if our [facility] is back open

## 2020-04-30 DIAGNOSIS — F51.01 PRIMARY INSOMNIA: ICD-10-CM

## 2020-04-30 RX ORDER — ZOLPIDEM TARTRATE 10 MG/1
10 TABLET ORAL NIGHTLY
Qty: 30 TABLET | Refills: 2 | Status: SHIPPED | OUTPATIENT
Start: 2020-04-30 | End: 2020-07-22 | Stop reason: SDUPTHER

## 2020-06-30 ENCOUNTER — TELEPHONE (OUTPATIENT)
Dept: FAMILY MEDICINE | Facility: CLINIC | Age: 65
End: 2020-06-30

## 2020-07-08 LAB
CHOLEST SERPL-MCNC: 239 MG/DL
CHOLEST/HDLC SERPL: 2.5 (CALC)
HDLC SERPL-MCNC: 94 MG/DL
LDLC SERPL CALC-MCNC: 118 MG/DL (CALC)
NONHDLC SERPL-MCNC: 145 MG/DL (CALC)
TRIGL SERPL-MCNC: 154 MG/DL

## 2020-07-13 ENCOUNTER — TELEPHONE (OUTPATIENT)
Dept: FAMILY MEDICINE | Facility: CLINIC | Age: 65
End: 2020-07-13

## 2020-07-13 NOTE — TELEPHONE ENCOUNTER
----- Message from Puja Taylor sent at 7/13/2020  1:28 PM CDT -----  - pt is returning aniya's call about blood work  [phone]

## 2020-07-13 NOTE — TELEPHONE ENCOUNTER
----- Message from Celio Birmingham MD sent at 7/12/2020  2:27 PM CDT -----  Call patient.  Her cholesterol has improved but is still 239.  HDL looks good at 94.  Triglycerides improved to 154.  LDL cholesterol also improved but is mildly elevated at 118.  To lower cholesterol  Further, try adding red yeast rice supplements 1 capsule twice a day.  Recheck CMP and lipids in 6 months

## 2020-07-13 NOTE — TELEPHONE ENCOUNTER
Spoke with pt and let her know the labs per Dr. Birmingham verbatim. Pt agrees to take OTC Red Yeast Rice 1 capsule twice daily. Remind me created to f/u in 6 mo

## 2020-07-22 DIAGNOSIS — F51.01 PRIMARY INSOMNIA: ICD-10-CM

## 2020-07-23 RX ORDER — ZOLPIDEM TARTRATE 10 MG/1
10 TABLET ORAL NIGHTLY
Qty: 30 TABLET | Refills: 2 | Status: SHIPPED | OUTPATIENT
Start: 2020-07-23 | End: 2020-10-21 | Stop reason: SDUPTHER

## 2020-08-07 ENCOUNTER — TELEPHONE (OUTPATIENT)
Dept: FAMILY MEDICINE | Facility: CLINIC | Age: 65
End: 2020-08-07

## 2020-08-07 NOTE — TELEPHONE ENCOUNTER
LMOR letting pt know I didn't call her but to call back if she needs anything to call back and let us know.

## 2020-08-07 NOTE — TELEPHONE ENCOUNTER
----- Message from America Marcus sent at 8/7/2020  9:48 AM CDT -----   9:47   Patient is returning Shantel's call. Pt's # [phone] GH

## 2020-08-25 DIAGNOSIS — F41.9 ANXIETY: ICD-10-CM

## 2020-08-26 RX ORDER — ESCITALOPRAM OXALATE 10 MG/1
10 TABLET ORAL DAILY
Qty: 30 TABLET | Refills: 3 | Status: SHIPPED | OUTPATIENT
Start: 2020-08-26 | End: 2020-10-21 | Stop reason: SDUPTHER

## 2020-09-26 DIAGNOSIS — F51.01 PRIMARY INSOMNIA: ICD-10-CM

## 2020-09-28 RX ORDER — ZOLPIDEM TARTRATE 10 MG/1
10 TABLET ORAL NIGHTLY
Qty: 30 TABLET | Refills: 1 | Status: CANCELLED | OUTPATIENT
Start: 2020-09-28

## 2020-10-06 ENCOUNTER — OFFICE VISIT (OUTPATIENT)
Dept: OBSTETRICS AND GYNECOLOGY | Facility: CLINIC | Age: 65
End: 2020-10-06
Attending: OBSTETRICS & GYNECOLOGY
Payer: MEDICARE

## 2020-10-06 VITALS
BODY MASS INDEX: 20.41 KG/M2 | DIASTOLIC BLOOD PRESSURE: 60 MMHG | WEIGHT: 103.94 LBS | SYSTOLIC BLOOD PRESSURE: 118 MMHG | HEIGHT: 60 IN

## 2020-10-06 DIAGNOSIS — Z01.419 ENCOUNTER FOR GYNECOLOGICAL EXAMINATION (GENERAL) (ROUTINE) WITHOUT ABNORMAL FINDINGS: Primary | ICD-10-CM

## 2020-10-06 DIAGNOSIS — Z12.31 VISIT FOR SCREENING MAMMOGRAM: ICD-10-CM

## 2020-10-06 DIAGNOSIS — Z01.419 WELL FEMALE EXAM WITH ROUTINE GYNECOLOGICAL EXAM: ICD-10-CM

## 2020-10-06 DIAGNOSIS — Z78.0 MENOPAUSE: ICD-10-CM

## 2020-10-06 DIAGNOSIS — Z11.51 SCREENING FOR HUMAN PAPILLOMAVIRUS: ICD-10-CM

## 2020-10-06 PROCEDURE — 88175 CYTOPATH C/V AUTO FLUID REDO: CPT

## 2020-10-06 PROCEDURE — 99999 PR PBB SHADOW E&M-EST. PATIENT-LVL IV: ICD-10-PCS | Mod: PBBFAC,,, | Performed by: OBSTETRICS & GYNECOLOGY

## 2020-10-06 PROCEDURE — 99999 PR PBB SHADOW E&M-EST. PATIENT-LVL IV: CPT | Mod: PBBFAC,,, | Performed by: OBSTETRICS & GYNECOLOGY

## 2020-10-06 PROCEDURE — G0101 CA SCREEN;PELVIC/BREAST EXAM: HCPCS | Mod: S$GLB,,, | Performed by: OBSTETRICS & GYNECOLOGY

## 2020-10-06 PROCEDURE — G0101 PR CA SCREEN;PELVIC/BREAST EXAM: ICD-10-PCS | Mod: S$GLB,,, | Performed by: OBSTETRICS & GYNECOLOGY

## 2020-10-06 PROCEDURE — 87624 HPV HI-RISK TYP POOLED RSLT: CPT

## 2020-10-06 NOTE — PROGRESS NOTES
Subjective:       Patient ID: Nika Smith is a 65 y.o. female.    Chief Complaint:  Well Woman (17-pap/hpv-negative/negative 1930-coihl-kmfwmr #1 2018-BMD)      History of Present Illness  65 year old here for an annual.  No complaints needs repeat bds and mmg.  Pap today.  Stopped hormones as she felt they increased her constipation.  No vaginal bleeding or pelvic pain.  No breast concerns.  Moods are stable with lexapro.    Taking vitamins and exercising     GYN & OB History  No LMP recorded (lmp unknown). Patient is postmenopausal.   Date of Last Pap: 7/15/2017    OB History    Para Term  AB Living   2 2       2   SAB TAB Ectopic Multiple Live Births           2      # Outcome Date GA Lbr Jordi/2nd Weight Sex Delivery Anes PTL Lv   2 Para     M Vag-Spont   FRANCY   1 Para    3.544 kg (7 lb 13 oz) M Vag-Spont   FRANCY       Past Medical History:   Diagnosis Date    Abnormal Pap smear of cervix     ASCUS favor reactive, repeat nl    History of osteopenia     hip    Insomnia     Menopause     Mitral valve prolapse        Past Surgical History:   Procedure Laterality Date    COLONOSCOPY      Dr Malhotra-[facility] 5 yr    COLONOSCOPY      Dr Malhotra- [facility] 3 yrs    DILATION AND CURETTAGE OF UTERUS  2002    menorrhagia, path benign    EYE SURGERY      Cataracts-Dr Westbrook       Review of Systems  Review of Systems   Constitutional: Negative for fatigue.   Respiratory: Negative for shortness of breath.    Cardiovascular: Negative for chest pain.   Gastrointestinal: Negative for abdominal pain, constipation, diarrhea and nausea.   Endocrine: Negative for heat intolerance.   Genitourinary: Negative for dyspareunia, dysuria, menstrual problem, pelvic pain and vaginal bleeding.   Musculoskeletal: Negative for back pain.   Skin: Negative for rash.   Neurological: Negative for headaches.   Hematological: Negative for adenopathy.   Psychiatric/Behavioral: Negative for dysphoric mood.  The patient is nervous/anxious.         Objective:   Physical Exam:   Constitutional: She is oriented to person, place, and time. She appears well-developed and well-nourished.      Neck: No thyromegaly present.     Pulmonary/Chest: Breath sounds normal. Right breast exhibits no mass, no nipple discharge, no skin change, no tenderness and no bleeding. Left breast exhibits no mass, no nipple discharge, no skin change, no tenderness and no bleeding.        Abdominal: Soft. Normal appearance and bowel sounds are normal. She exhibits no distension and no mass. There is no abdominal tenderness. There is no rebound and no guarding.     Genitourinary:    Vagina and uterus normal.      Pelvic exam was performed with patient supine.   There is no rash, tenderness, lesion or injury on the right labia. There is no rash, tenderness, lesion or injury on the left labia. Uterus is not enlarged, not fixed and not tender. Cervix is normal. Right adnexum displays no mass, no tenderness and no fullness. Left adnexum displays no mass, no tenderness and no fullness. No erythema, tenderness, rectocele, cystocele or unspecified prolapse of vaginal walls in the vagina.    No signs of injury in the vagina.   Cervix exhibits no motion tenderness, no discharge and no friability. negative for vaginal discharge          Musculoskeletal: Normal range of motion and moves all extremeties.      Lymphadenopathy:        Right: No supraclavicular adenopathy present.        Left: No supraclavicular adenopathy present.    Neurological: She is alert and oriented to person, place, and time.    Skin: Skin is warm and dry.    Psychiatric: She has a normal mood and affect. Her behavior is normal. Judgment normal.        Assessment/ Plan:     Encounter for gynecological examination (general) (routine) without abnormal findings    Visit for screening mammogram  -     Mammo Digital Screening Bilat w/ Tom; Future; Expected date: 10/06/2020    Menopause  -      DXA Bone Density Spine And Hip; Future; Expected date: 10/06/2020    Well female exam with routine gynecological exam  -     Liquid-Based Pap Smear, Screening    Screening for human papillomavirus  -     HPV High Risk Genotypes, PCR         Follow up in about 1 year (around 10/6/2021).    Patient was counseled today on A.C.S. Pap guidelines and recommendations for yearly pelvic exams, mammograms and monthly self breast exams; to see her PCP for other health maintenance.

## 2020-10-15 LAB
HPV HR 12 DNA SPEC QL NAA+PROBE: NEGATIVE
HPV16 AG SPEC QL: NEGATIVE
HPV18 DNA SPEC QL NAA+PROBE: NEGATIVE

## 2020-10-21 ENCOUNTER — OFFICE VISIT (OUTPATIENT)
Dept: FAMILY MEDICINE | Facility: CLINIC | Age: 65
End: 2020-10-21
Payer: MEDICARE

## 2020-10-21 VITALS
SYSTOLIC BLOOD PRESSURE: 110 MMHG | HEIGHT: 61 IN | BODY MASS INDEX: 19.26 KG/M2 | TEMPERATURE: 98 F | DIASTOLIC BLOOD PRESSURE: 70 MMHG | HEART RATE: 62 BPM | WEIGHT: 102 LBS

## 2020-10-21 DIAGNOSIS — N63.10 BREAST MASS, RIGHT: Primary | ICD-10-CM

## 2020-10-21 DIAGNOSIS — F41.9 ANXIETY: ICD-10-CM

## 2020-10-21 DIAGNOSIS — M15.9 PRIMARY OSTEOARTHRITIS INVOLVING MULTIPLE JOINTS: ICD-10-CM

## 2020-10-21 DIAGNOSIS — M85.851 OSTEOPENIA OF NECKS OF BOTH FEMURS: ICD-10-CM

## 2020-10-21 DIAGNOSIS — N60.01 BENIGN BREAST CYST IN FEMALE, RIGHT: ICD-10-CM

## 2020-10-21 DIAGNOSIS — F51.01 PRIMARY INSOMNIA: ICD-10-CM

## 2020-10-21 DIAGNOSIS — E78.00 PURE HYPERCHOLESTEROLEMIA: ICD-10-CM

## 2020-10-21 DIAGNOSIS — M85.852 OSTEOPENIA OF NECKS OF BOTH FEMURS: ICD-10-CM

## 2020-10-21 DIAGNOSIS — R92.8 ABNORMAL MAMMOGRAM OF RIGHT BREAST: ICD-10-CM

## 2020-10-21 PROCEDURE — 1101F PT FALLS ASSESS-DOCD LE1/YR: CPT | Mod: S$GLB,,, | Performed by: FAMILY MEDICINE

## 2020-10-21 PROCEDURE — 99214 PR OFFICE/OUTPT VISIT, EST, LEVL IV, 30-39 MIN: ICD-10-PCS | Mod: S$GLB,,, | Performed by: FAMILY MEDICINE

## 2020-10-21 PROCEDURE — 99214 OFFICE O/P EST MOD 30 MIN: CPT | Mod: S$GLB,,, | Performed by: FAMILY MEDICINE

## 2020-10-21 PROCEDURE — 1159F MED LIST DOCD IN RCRD: CPT | Mod: S$GLB,,, | Performed by: FAMILY MEDICINE

## 2020-10-21 PROCEDURE — 3008F PR BODY MASS INDEX (BMI) DOCUMENTED: ICD-10-PCS | Mod: S$GLB,,, | Performed by: FAMILY MEDICINE

## 2020-10-21 PROCEDURE — 3008F BODY MASS INDEX DOCD: CPT | Mod: S$GLB,,, | Performed by: FAMILY MEDICINE

## 2020-10-21 PROCEDURE — 1101F PR PT FALLS ASSESS DOC 0-1 FALLS W/OUT INJ PAST YR: ICD-10-PCS | Mod: S$GLB,,, | Performed by: FAMILY MEDICINE

## 2020-10-21 PROCEDURE — 1159F PR MEDICATION LIST DOCUMENTED IN MEDICAL RECORD: ICD-10-PCS | Mod: S$GLB,,, | Performed by: FAMILY MEDICINE

## 2020-10-21 RX ORDER — ESCITALOPRAM OXALATE 10 MG/1
10 TABLET ORAL DAILY
Qty: 90 TABLET | Refills: 1 | Status: SHIPPED | OUTPATIENT
Start: 2020-10-21 | End: 2021-08-20

## 2020-10-21 RX ORDER — ZOLPIDEM TARTRATE 10 MG/1
10 TABLET ORAL NIGHTLY
Qty: 90 TABLET | Refills: 1 | Status: SHIPPED | OUTPATIENT
Start: 2020-10-21 | End: 2021-04-26 | Stop reason: SDUPTHER

## 2020-10-21 NOTE — PROGRESS NOTES
SUBJECTIVE:    Patient ID: Nika Smith is a 65 y.o. female.    Chief Complaint: Follow-up (Pt brought bottles, wants refills, DJ)    65-year-old female coming in for annual physical.  Complains 3 month history a left medial elbow pain.  Worse with lifting heavy items.  She does exercise 7 days a week by riding a bike 1 hour in the neighborhood.  SHe does like to go to gym classes and yoga 2 times a week.    2020-colonoscopy with Dr. Malhotra-[facility] 3 years    She quit smoking 35 years ago and only smoked 5 years total.    Her mammogram shows a cyst or smooth lesion 2.5 cm in the right breast.      Office Visit on 10/06/2020   Component Date Value Ref Range Status    HPV other High Risk types, PCR 10/06/2020 Negative  Negative Final    HPV High Risk type 16, PCR 10/06/2020 Negative  Negative Final    HPV High Risk type 18, PCR 10/06/2020 Negative  Negative Final   Telephone on 04/28/2020   Component Date Value Ref Range Status    Cholesterol 07/07/2020 239* <200 mg/dL Final    HDL 07/07/2020 94  > OR = 50 mg/dL Final    Triglycerides 07/07/2020 154* <150 mg/dL Final    LDL Cholesterol 07/07/2020 118* mg/dL (calc) Final    Hdl/Cholesterol Ratio 07/07/2020 2.5  <5.0 (calc) Final    Non HDL Chol. (LDL+VLDL) 07/07/2020 145* <130 mg/dL (calc) Final       Past Medical History:   Diagnosis Date    Abnormal Pap smear of cervix 1989    ASCUS favor reactive, repeat nl    History of osteopenia     hip    Insomnia     Menopause 2005    Mitral valve prolapse      Social History     Socioeconomic History    Marital status:      Spouse name: Not on file    Number of children: Not on file    Years of education: Not on file    Highest education level: Not on file   Occupational History    Not on file   Social Needs    Financial resource strain: Not on file    Food insecurity     Worry: Not on file     Inability: Not on file    Transportation needs     Medical: Not on file     Non-medical: Not on file    Tobacco Use    Smoking status: Former Smoker    Smokeless tobacco: Former User   Substance and Sexual Activity    Alcohol use: Yes    Drug use: No    Sexual activity: Not Currently   Lifestyle    Physical activity     Days per week: Not on file     Minutes per session: Not on file    Stress: Not on file   Relationships    Social connections     Talks on phone: Not on file     Gets together: Not on file     Attends Yarsanism service: Not on file     Active member of club or organization: Not on file     Attends meetings of clubs or organizations: Not on file     Relationship status: Not on file   Other Topics Concern    Not on file   Social History Narrative    Not on file     Past Surgical History:   Procedure Laterality Date    COLONOSCOPY  2015    Dr Alfaro 5 yr    COLONOSCOPY  2020    Dr Hung parra 3 yrs    DILATION AND CURETTAGE OF UTERUS  2002    menorrhagia, path benign    EYE SURGERY      Cataracts-Dr Westbrook     Family History   Problem Relation Age of Onset    Diabetes Father     Hypertension Father     Heart attack Father     Coronary artery disease Father     Breast cancer Sister 65       Review of patient's allergies indicates:  No Known Allergies    Current Outpatient Medications:     escitalopram oxalate (LEXAPRO) 10 MG tablet, Take 1 tablet (10 mg total) by mouth once daily., Disp: 90 tablet, Rfl: 1    zolpidem (AMBIEN) 10 mg Tab, Take 1 tablet (10 mg total) by mouth every evening., Disp: 90 tablet, Rfl: 1    calcium citrate-vitamin D3 200 mg calcium -250 unit Tab, Take 3 tablets by mouth once daily., Disp: , Rfl:     EYE ITCH RELIEF 0.025 % (0.035 %) ophthalmic solution, , Disp: , Rfl:     FLAXSEED OIL MISC, , Disp: , Rfl:     glucosam-MSM-chond-hyaluron ac (GLUCOSAMINE-CHONDROITIN) 982--1 mg Tab, Take 2 tablets by mouth once daily., Disp: , Rfl:     mineral oil/petrolatum,white (LUBRICANT EYE OPHT), , Disp: , Rfl:     multivit with minerals/lutein  "(MULTIVITAMIN 50 PLUS ORAL), , Disp: , Rfl:     Review of Systems   Constitutional: Negative for appetite change, chills, fatigue, fever and unexpected weight change.   HENT: Negative for congestion, ear pain, sinus pain, sore throat and trouble swallowing.    Eyes: Negative for pain, discharge and visual disturbance.   Respiratory: Positive for shortness of breath. Negative for apnea, cough and wheezing.    Cardiovascular: Negative for chest pain, palpitations and leg swelling.   Gastrointestinal: Negative for abdominal pain, blood in stool, constipation, diarrhea, nausea and vomiting.   Endocrine: Negative for heat intolerance, polydipsia and polyuria.   Genitourinary: Positive for urgency. Negative for difficulty urinating, dyspareunia, dysuria, frequency, hematuria and menstrual problem.        No nocturia    Musculoskeletal: Negative for arthralgias (OA deformities to fingers), back pain, gait problem, joint swelling and myalgias.   Allergic/Immunologic: Negative for environmental allergies, food allergies and immunocompromised state.   Neurological: Negative for dizziness, tremors, seizures, numbness and headaches.   Psychiatric/Behavioral: Negative for behavioral problems, confusion, hallucinations and suicidal ideas. Self-injury: ambien nightly. The patient is not nervous/anxious.           Objective:      Vitals:    10/21/20 0822   BP: 110/70   Pulse: 62   Temp: 98 °F (36.7 °C)   Weight: 46.3 kg (102 lb)   Height: 5' 1" (1.549 m)     Physical Exam  Vitals signs and nursing note reviewed.   Constitutional:       Appearance: She is well-developed.      Comments: Thin female in no apparent distress   HENT:      Head: Normocephalic and atraumatic.      Right Ear: External ear normal.      Left Ear: External ear normal.      Nose: Nose normal.   Eyes:      Pupils: Pupils are equal, round, and reactive to light.   Neck:      Musculoskeletal: Normal range of motion and neck supple.      Thyroid: No thyromegaly.    "   Vascular: No carotid bruit.   Cardiovascular:      Rate and Rhythm: Normal rate and regular rhythm.      Heart sounds: Normal heart sounds. No murmur.   Pulmonary:      Effort: Pulmonary effort is normal.      Breath sounds: Normal breath sounds. No wheezing or rales.   Abdominal:      General: Bowel sounds are normal. There is no distension.      Palpations: Abdomen is soft.      Tenderness: There is no abdominal tenderness.   Musculoskeletal: Normal range of motion.         General: No tenderness or deformity.      Lumbar back: Normal. She exhibits no pain and no spasm.      Comments: Bends 90 degrees at  waist shoulders and knees have good range of motion, hands have mild osteoarthritis deformities.  No edema to the lower extremities.   Lymphadenopathy:      Cervical: No cervical adenopathy.   Skin:     General: Skin is warm and dry.      Findings: No rash.   Neurological:      Mental Status: She is alert and oriented to person, place, and time.      Cranial Nerves: No cranial nerve deficit.      Coordination: Coordination normal.   Psychiatric:         Behavior: Behavior normal.         Thought Content: Thought content normal.         Judgment: Judgment normal.           Assessment:       1. Breast mass, right    2. Primary insomnia    3. Anxiety    4. Pure hypercholesterolemia    5. Benign breast cyst in female, right    6. Abnormal mammogram of right breast    7. Osteopenia of necks of both femurs    8. Primary osteoarthritis involving multiple joints         Plan:       Breast mass, right  -     US BREAST SCREENING UNILATERAL; Future; Expected date: 10/21/2020  -     Mammo Digital Diagnostic Right with Tom; Future; Expected date: 10/21/2020  Set of breast ultrasound and additional views for the right breast.  Primary insomnia  -     zolpidem (AMBIEN) 10 mg Tab; Take 1 tablet (10 mg total) by mouth every evening.  Dispense: 90 tablet; Refill: 1  Refill zolpidem, try to reduce it to just several days a  week  Anxiety  -     escitalopram oxalate (LEXAPRO) 10 MG tablet; Take 1 tablet (10 mg total) by mouth once daily.  Dispense: 90 tablet; Refill: 1  Refill escitalopram  Pure hypercholesterolemia  -     CBC auto differential; Future; Expected date: 10/21/2020  -     Comprehensive Metabolic Panel; Future; Expected date: 10/21/2020  -     Lipid Panel; Future; Expected date: 10/21/2020  -     TSH w/reflex to FT4; Future; Expected date: 10/21/2020  cholesterol 239 triglycerides 154 HDL 94 .  No need for cholesterol medications at this time  Benign breast cyst in female, right  -     US BREAST SCREENING UNILATERAL; Future; Expected date: 10/21/2020    Abnormal mammogram of right breast  -     Mammo Digital Diagnostic Right with Tom; Future; Expected date: 10/21/2020    Osteopenia of necks of both femurs  Calcium plus D recommended.  She used to take alendronate  Primary osteoarthritis involving multiple joints      No follow-ups on file.        10/21/2020 Celio Birmingham

## 2020-11-01 LAB
FINAL PATHOLOGIC DIAGNOSIS: NORMAL
Lab: NORMAL

## 2020-11-10 LAB
ALBUMIN SERPL-MCNC: 4.1 G/DL (ref 3.6–5.1)
ALBUMIN/GLOB SERPL: 1.6 (CALC) (ref 1–2.5)
ALP SERPL-CCNC: 65 U/L (ref 37–153)
ALT SERPL-CCNC: 12 U/L (ref 6–29)
AST SERPL-CCNC: 22 U/L (ref 10–35)
BASOPHILS # BLD AUTO: 29 CELLS/UL (ref 0–200)
BASOPHILS NFR BLD AUTO: 0.6 %
BILIRUB SERPL-MCNC: 0.5 MG/DL (ref 0.2–1.2)
BUN SERPL-MCNC: 16 MG/DL (ref 7–25)
BUN/CREAT SERPL: NORMAL (CALC) (ref 6–22)
CALCIUM SERPL-MCNC: 9.6 MG/DL (ref 8.6–10.4)
CHLORIDE SERPL-SCNC: 103 MMOL/L (ref 98–110)
CHOLEST SERPL-MCNC: 221 MG/DL
CHOLEST/HDLC SERPL: 2.4 (CALC)
CO2 SERPL-SCNC: 31 MMOL/L (ref 20–32)
CREAT SERPL-MCNC: 0.78 MG/DL (ref 0.5–0.99)
EOSINOPHIL # BLD AUTO: 53 CELLS/UL (ref 15–500)
EOSINOPHIL NFR BLD AUTO: 1.1 %
ERYTHROCYTE [DISTWIDTH] IN BLOOD BY AUTOMATED COUNT: 12.8 % (ref 11–15)
GFRSERPLBLD MDRD-ARVRAT: 79 ML/MIN/1.73M2
GLOBULIN SER CALC-MCNC: 2.5 G/DL (CALC) (ref 1.9–3.7)
GLUCOSE SERPL-MCNC: 94 MG/DL (ref 65–99)
HCT VFR BLD AUTO: 42.1 % (ref 35–45)
HDLC SERPL-MCNC: 91 MG/DL
HGB BLD-MCNC: 13.8 G/DL (ref 11.7–15.5)
LDLC SERPL CALC-MCNC: 110 MG/DL (CALC)
LYMPHOCYTES # BLD AUTO: 1565 CELLS/UL (ref 850–3900)
LYMPHOCYTES NFR BLD AUTO: 32.6 %
MCH RBC QN AUTO: 30.7 PG (ref 27–33)
MCHC RBC AUTO-ENTMCNC: 32.8 G/DL (ref 32–36)
MCV RBC AUTO: 93.6 FL (ref 80–100)
MONOCYTES # BLD AUTO: 331 CELLS/UL (ref 200–950)
MONOCYTES NFR BLD AUTO: 6.9 %
NEUTROPHILS # BLD AUTO: 2822 CELLS/UL (ref 1500–7800)
NEUTROPHILS NFR BLD AUTO: 58.8 %
NONHDLC SERPL-MCNC: 130 MG/DL (CALC)
PLATELET # BLD AUTO: 196 THOUSAND/UL (ref 140–400)
PMV BLD REES-ECKER: 11.4 FL (ref 7.5–12.5)
POTASSIUM SERPL-SCNC: 4.4 MMOL/L (ref 3.5–5.3)
PROT SERPL-MCNC: 6.6 G/DL (ref 6.1–8.1)
RBC # BLD AUTO: 4.5 MILLION/UL (ref 3.8–5.1)
SODIUM SERPL-SCNC: 140 MMOL/L (ref 135–146)
TRIGL SERPL-MCNC: 98 MG/DL
TSH SERPL-ACNC: 0.98 MIU/L (ref 0.4–4.5)
WBC # BLD AUTO: 4.8 THOUSAND/UL (ref 3.8–10.8)

## 2020-11-16 ENCOUNTER — TELEPHONE (OUTPATIENT)
Dept: FAMILY MEDICINE | Facility: CLINIC | Age: 65
End: 2020-11-16

## 2020-11-16 NOTE — TELEPHONE ENCOUNTER
----- Message from Celio Birmingham MD sent at 11/14/2020  6:14 PM CST -----  Call patient.  CBC shows no anemia.  Sugar, kidneys, liver and thyroid are all normal.  Cholesterol has improved from 239 down to 221.  Continue current medications.

## 2021-02-17 ENCOUNTER — TELEPHONE (OUTPATIENT)
Dept: FAMILY MEDICINE | Facility: CLINIC | Age: 66
End: 2021-02-17

## 2021-02-18 ENCOUNTER — OFFICE VISIT (OUTPATIENT)
Dept: FAMILY MEDICINE | Facility: CLINIC | Age: 66
End: 2021-02-18
Payer: MEDICARE

## 2021-02-18 VITALS
SYSTOLIC BLOOD PRESSURE: 118 MMHG | HEART RATE: 68 BPM | WEIGHT: 105 LBS | HEIGHT: 61 IN | DIASTOLIC BLOOD PRESSURE: 68 MMHG | BODY MASS INDEX: 19.83 KG/M2

## 2021-02-18 DIAGNOSIS — M15.9 PRIMARY OSTEOARTHRITIS INVOLVING MULTIPLE JOINTS: ICD-10-CM

## 2021-02-18 DIAGNOSIS — F41.9 ANXIETY: Primary | ICD-10-CM

## 2021-02-18 DIAGNOSIS — M51.9 LUMBAR DISC DISEASE: ICD-10-CM

## 2021-02-18 DIAGNOSIS — F51.01 PRIMARY INSOMNIA: ICD-10-CM

## 2021-02-18 PROCEDURE — 1159F PR MEDICATION LIST DOCUMENTED IN MEDICAL RECORD: ICD-10-PCS | Mod: S$GLB,,, | Performed by: FAMILY MEDICINE

## 2021-02-18 PROCEDURE — 3008F PR BODY MASS INDEX (BMI) DOCUMENTED: ICD-10-PCS | Mod: S$GLB,,, | Performed by: FAMILY MEDICINE

## 2021-02-18 PROCEDURE — 99214 PR OFFICE/OUTPT VISIT, EST, LEVL IV, 30-39 MIN: ICD-10-PCS | Mod: S$GLB,,, | Performed by: FAMILY MEDICINE

## 2021-02-18 PROCEDURE — 3008F BODY MASS INDEX DOCD: CPT | Mod: S$GLB,,, | Performed by: FAMILY MEDICINE

## 2021-02-18 PROCEDURE — 99214 OFFICE O/P EST MOD 30 MIN: CPT | Mod: S$GLB,,, | Performed by: FAMILY MEDICINE

## 2021-02-18 PROCEDURE — 1159F MED LIST DOCD IN RCRD: CPT | Mod: S$GLB,,, | Performed by: FAMILY MEDICINE

## 2021-02-23 ENCOUNTER — TELEPHONE (OUTPATIENT)
Dept: FAMILY MEDICINE | Facility: CLINIC | Age: 66
End: 2021-02-23

## 2021-04-05 ENCOUNTER — TELEPHONE (OUTPATIENT)
Dept: FAMILY MEDICINE | Facility: CLINIC | Age: 66
End: 2021-04-05

## 2021-04-26 DIAGNOSIS — F51.01 PRIMARY INSOMNIA: ICD-10-CM

## 2021-04-26 RX ORDER — ZOLPIDEM TARTRATE 10 MG/1
10 TABLET ORAL NIGHTLY
Qty: 90 TABLET | Refills: 1 | Status: SHIPPED | OUTPATIENT
Start: 2021-04-26 | End: 2021-10-21 | Stop reason: SDUPTHER

## 2021-08-20 ENCOUNTER — OFFICE VISIT (OUTPATIENT)
Dept: FAMILY MEDICINE | Facility: CLINIC | Age: 66
End: 2021-08-20
Payer: MEDICARE

## 2021-08-20 VITALS
HEART RATE: 72 BPM | DIASTOLIC BLOOD PRESSURE: 72 MMHG | HEIGHT: 61 IN | SYSTOLIC BLOOD PRESSURE: 118 MMHG | WEIGHT: 103 LBS | BODY MASS INDEX: 19.45 KG/M2

## 2021-08-20 DIAGNOSIS — F41.9 ANXIETY: ICD-10-CM

## 2021-08-20 DIAGNOSIS — R22.41 MASS OF RIGHT LOWER LEG: Primary | ICD-10-CM

## 2021-08-20 PROCEDURE — 3074F PR MOST RECENT SYSTOLIC BLOOD PRESSURE < 130 MM HG: ICD-10-PCS | Mod: S$GLB,,, | Performed by: NURSE PRACTITIONER

## 2021-08-20 PROCEDURE — 99213 OFFICE O/P EST LOW 20 MIN: CPT | Mod: S$GLB,,, | Performed by: NURSE PRACTITIONER

## 2021-08-20 PROCEDURE — 1160F PR REVIEW ALL MEDS BY PRESCRIBER/CLIN PHARMACIST DOCUMENTED: ICD-10-PCS | Mod: S$GLB,,, | Performed by: NURSE PRACTITIONER

## 2021-08-20 PROCEDURE — 1160F RVW MEDS BY RX/DR IN RCRD: CPT | Mod: S$GLB,,, | Performed by: NURSE PRACTITIONER

## 2021-08-20 PROCEDURE — 3078F PR MOST RECENT DIASTOLIC BLOOD PRESSURE < 80 MM HG: ICD-10-PCS | Mod: S$GLB,,, | Performed by: NURSE PRACTITIONER

## 2021-08-20 PROCEDURE — 3078F DIAST BP <80 MM HG: CPT | Mod: S$GLB,,, | Performed by: NURSE PRACTITIONER

## 2021-08-20 PROCEDURE — 3008F PR BODY MASS INDEX (BMI) DOCUMENTED: ICD-10-PCS | Mod: S$GLB,,, | Performed by: NURSE PRACTITIONER

## 2021-08-20 PROCEDURE — 3008F BODY MASS INDEX DOCD: CPT | Mod: S$GLB,,, | Performed by: NURSE PRACTITIONER

## 2021-08-20 PROCEDURE — 1159F MED LIST DOCD IN RCRD: CPT | Mod: S$GLB,,, | Performed by: NURSE PRACTITIONER

## 2021-08-20 PROCEDURE — 3074F SYST BP LT 130 MM HG: CPT | Mod: S$GLB,,, | Performed by: NURSE PRACTITIONER

## 2021-08-20 PROCEDURE — 99213 PR OFFICE/OUTPT VISIT, EST, LEVL III, 20-29 MIN: ICD-10-PCS | Mod: S$GLB,,, | Performed by: NURSE PRACTITIONER

## 2021-08-20 PROCEDURE — 1159F PR MEDICATION LIST DOCUMENTED IN MEDICAL RECORD: ICD-10-PCS | Mod: S$GLB,,, | Performed by: NURSE PRACTITIONER

## 2021-08-20 RX ORDER — SERTRALINE HYDROCHLORIDE 25 MG/1
25 TABLET, FILM COATED ORAL NIGHTLY
COMMUNITY
Start: 2021-07-31 | End: 2021-08-20 | Stop reason: SDUPTHER

## 2021-08-20 RX ORDER — SERTRALINE HYDROCHLORIDE 25 MG/1
25 TABLET, FILM COATED ORAL NIGHTLY
Qty: 90 TABLET | Refills: 1 | Status: SHIPPED | OUTPATIENT
Start: 2021-08-20 | End: 2022-02-17

## 2021-08-25 ENCOUNTER — TELEPHONE (OUTPATIENT)
Dept: FAMILY MEDICINE | Facility: CLINIC | Age: 66
End: 2021-08-25

## 2021-08-25 DIAGNOSIS — R22.41 MASS OF RIGHT LOWER LEG: Primary | ICD-10-CM

## 2021-09-14 ENCOUNTER — OFFICE VISIT (OUTPATIENT)
Dept: SURGERY | Facility: CLINIC | Age: 66
End: 2021-09-14
Payer: MEDICARE

## 2021-09-14 VITALS — BODY MASS INDEX: 19.45 KG/M2 | HEIGHT: 61 IN | WEIGHT: 103 LBS | TEMPERATURE: 99 F

## 2021-09-14 DIAGNOSIS — R22.41 MASS OF RIGHT LOWER LEG: Primary | ICD-10-CM

## 2021-09-14 PROCEDURE — 3288F FALL RISK ASSESSMENT DOCD: CPT | Mod: CPTII,S$GLB,, | Performed by: SURGERY

## 2021-09-14 PROCEDURE — 1101F PR PT FALLS ASSESS DOC 0-1 FALLS W/OUT INJ PAST YR: ICD-10-PCS | Mod: CPTII,S$GLB,, | Performed by: SURGERY

## 2021-09-14 PROCEDURE — 1160F RVW MEDS BY RX/DR IN RCRD: CPT | Mod: CPTII,S$GLB,, | Performed by: SURGERY

## 2021-09-14 PROCEDURE — 1101F PT FALLS ASSESS-DOCD LE1/YR: CPT | Mod: CPTII,S$GLB,, | Performed by: SURGERY

## 2021-09-14 PROCEDURE — 1126F AMNT PAIN NOTED NONE PRSNT: CPT | Mod: CPTII,S$GLB,, | Performed by: SURGERY

## 2021-09-14 PROCEDURE — 1159F PR MEDICATION LIST DOCUMENTED IN MEDICAL RECORD: ICD-10-PCS | Mod: CPTII,S$GLB,, | Performed by: SURGERY

## 2021-09-14 PROCEDURE — 1159F MED LIST DOCD IN RCRD: CPT | Mod: CPTII,S$GLB,, | Performed by: SURGERY

## 2021-09-14 PROCEDURE — 3288F PR FALLS RISK ASSESSMENT DOCUMENTED: ICD-10-PCS | Mod: CPTII,S$GLB,, | Performed by: SURGERY

## 2021-09-14 PROCEDURE — 1160F PR REVIEW ALL MEDS BY PRESCRIBER/CLIN PHARMACIST DOCUMENTED: ICD-10-PCS | Mod: CPTII,S$GLB,, | Performed by: SURGERY

## 2021-09-14 PROCEDURE — 99203 OFFICE O/P NEW LOW 30 MIN: CPT | Mod: S$GLB,,, | Performed by: SURGERY

## 2021-09-14 PROCEDURE — 99203 PR OFFICE/OUTPT VISIT, NEW, LEVL III, 30-44 MIN: ICD-10-PCS | Mod: S$GLB,,, | Performed by: SURGERY

## 2021-09-14 PROCEDURE — 3008F BODY MASS INDEX DOCD: CPT | Mod: CPTII,S$GLB,, | Performed by: SURGERY

## 2021-09-14 PROCEDURE — 3008F PR BODY MASS INDEX (BMI) DOCUMENTED: ICD-10-PCS | Mod: CPTII,S$GLB,, | Performed by: SURGERY

## 2021-09-14 PROCEDURE — 1126F PR PAIN SEVERITY QUANTIFIED, NO PAIN PRESENT: ICD-10-PCS | Mod: CPTII,S$GLB,, | Performed by: SURGERY

## 2021-09-27 ENCOUNTER — HOSPITAL ENCOUNTER (OUTPATIENT)
Dept: PREADMISSION TESTING | Facility: HOSPITAL | Age: 66
Discharge: HOME | End: 2021-09-27
Attending: SURGERY
Payer: MEDICARE

## 2021-09-27 ENCOUNTER — HOSPITAL ENCOUNTER (OUTPATIENT)
Dept: RADIOLOGY | Facility: HOSPITAL | Age: 66
Discharge: HOME | End: 2021-09-27
Attending: SURGERY
Payer: MEDICARE

## 2021-09-27 VITALS
DIASTOLIC BLOOD PRESSURE: 73 MMHG | TEMPERATURE: 98 F | HEART RATE: 56 BPM | BODY MASS INDEX: 19.45 KG/M2 | HEIGHT: 61 IN | RESPIRATION RATE: 17 BRPM | WEIGHT: 103 LBS | SYSTOLIC BLOOD PRESSURE: 117 MMHG

## 2021-09-27 DIAGNOSIS — R22.41 MASS OF RIGHT LOWER LEG: ICD-10-CM

## 2021-09-27 DIAGNOSIS — Z01.818 PREOP TESTING: Primary | ICD-10-CM

## 2021-09-27 LAB — SARS-COV-2 RDRP RESP QL NAA+PROBE: NEGATIVE

## 2021-09-27 PROCEDURE — 71046 X-RAY EXAM CHEST 2 VIEWS: CPT | Mod: TC

## 2021-09-27 PROCEDURE — U0002 COVID-19 LAB TEST NON-CDC: HCPCS | Performed by: SURGERY

## 2021-09-28 ENCOUNTER — PATIENT MESSAGE (OUTPATIENT)
Dept: SURGERY | Facility: HOSPITAL | Age: 66
End: 2021-09-28

## 2021-09-29 ENCOUNTER — HOSPITAL ENCOUNTER (OUTPATIENT)
Facility: HOSPITAL | Age: 66
Discharge: HOME | End: 2021-09-29
Attending: SURGERY | Admitting: SURGERY
Payer: MEDICARE

## 2021-09-29 ENCOUNTER — ANESTHESIA (OUTPATIENT)
Dept: SURGERY | Facility: HOSPITAL | Age: 66
End: 2021-09-29
Payer: MEDICARE

## 2021-09-29 ENCOUNTER — ANESTHESIA EVENT (OUTPATIENT)
Dept: SURGERY | Facility: HOSPITAL | Age: 66
End: 2021-09-29
Payer: MEDICARE

## 2021-09-29 VITALS
DIASTOLIC BLOOD PRESSURE: 61 MMHG | RESPIRATION RATE: 16 BRPM | SYSTOLIC BLOOD PRESSURE: 105 MMHG | BODY MASS INDEX: 19.45 KG/M2 | HEART RATE: 54 BPM | OXYGEN SATURATION: 97 % | TEMPERATURE: 98 F | WEIGHT: 102.94 LBS

## 2021-09-29 DIAGNOSIS — R22.41 MASS OF RIGHT LOWER LEG: Primary | ICD-10-CM

## 2021-09-29 PROCEDURE — 37000008 HC ANESTHESIA 1ST 15 MINUTES: Performed by: SURGERY

## 2021-09-29 PROCEDURE — 25000003 PHARM REV CODE 250: Performed by: NURSE ANESTHETIST, CERTIFIED REGISTERED

## 2021-09-29 PROCEDURE — 25000003 PHARM REV CODE 250: Performed by: ANESTHESIOLOGY

## 2021-09-29 PROCEDURE — 88304 TISSUE EXAM BY PATHOLOGIST: CPT | Mod: TC

## 2021-09-29 PROCEDURE — 80048 BASIC METABOLIC PNL TOTAL CA: CPT | Performed by: SURGERY

## 2021-09-29 PROCEDURE — 36000706: Performed by: SURGERY

## 2021-09-29 PROCEDURE — 63600175 PHARM REV CODE 636 W HCPCS: Performed by: NURSE ANESTHETIST, CERTIFIED REGISTERED

## 2021-09-29 PROCEDURE — 37000009 HC ANESTHESIA EA ADD 15 MINS: Performed by: SURGERY

## 2021-09-29 PROCEDURE — 27000284 HC CANNULA NASAL: Performed by: ANESTHESIOLOGY

## 2021-09-29 PROCEDURE — 36000707: Performed by: SURGERY

## 2021-09-29 PROCEDURE — 27000673 HC TUBING BLOOD Y: Performed by: ANESTHESIOLOGY

## 2021-09-29 PROCEDURE — 27000671 HC TUBING MICROBORE EXT: Performed by: ANESTHESIOLOGY

## 2021-09-29 PROCEDURE — 11400 EXC TR-EXT B9+MARG 0.5 CM<: CPT | Mod: ,,, | Performed by: SURGERY

## 2021-09-29 PROCEDURE — 63600175 PHARM REV CODE 636 W HCPCS: Performed by: SURGERY

## 2021-09-29 PROCEDURE — 71000015 HC POSTOP RECOV 1ST HR: Performed by: SURGERY

## 2021-09-29 PROCEDURE — 11400 PR EXC SKIN BENIG <0.5 CM TRUNK,ARM,LEG: ICD-10-PCS | Mod: ,,, | Performed by: SURGERY

## 2021-09-29 RX ORDER — SODIUM CHLORIDE 0.9 % (FLUSH) 0.9 %
10 SYRINGE (ML) INJECTION
Status: DISCONTINUED | OUTPATIENT
Start: 2021-09-29 | End: 2021-09-29 | Stop reason: HOSPADM

## 2021-09-29 RX ORDER — GABAPENTIN 100 MG/1
100 CAPSULE ORAL ONCE
Status: COMPLETED | OUTPATIENT
Start: 2021-09-29 | End: 2021-09-29

## 2021-09-29 RX ORDER — ONDANSETRON 2 MG/ML
INJECTION INTRAMUSCULAR; INTRAVENOUS
Status: DISCONTINUED | OUTPATIENT
Start: 2021-09-29 | End: 2021-09-29

## 2021-09-29 RX ORDER — FENTANYL CITRATE 50 UG/ML
25 INJECTION, SOLUTION INTRAMUSCULAR; INTRAVENOUS
Status: DISCONTINUED | OUTPATIENT
Start: 2021-09-29 | End: 2021-09-29 | Stop reason: HOSPADM

## 2021-09-29 RX ORDER — MEPERIDINE HYDROCHLORIDE 50 MG/ML
12.5 INJECTION INTRAMUSCULAR; INTRAVENOUS; SUBCUTANEOUS EVERY 10 MIN PRN
Status: DISCONTINUED | OUTPATIENT
Start: 2021-09-29 | End: 2021-09-29 | Stop reason: HOSPADM

## 2021-09-29 RX ORDER — OXYCODONE HYDROCHLORIDE 5 MG/1
5 TABLET ORAL
Status: DISCONTINUED | OUTPATIENT
Start: 2021-09-29 | End: 2021-09-29 | Stop reason: HOSPADM

## 2021-09-29 RX ORDER — PROPOFOL 10 MG/ML
VIAL (ML) INTRAVENOUS
Status: DISCONTINUED | OUTPATIENT
Start: 2021-09-29 | End: 2021-09-29

## 2021-09-29 RX ORDER — CEFAZOLIN SODIUM 2 G/50ML
2 SOLUTION INTRAVENOUS
Status: COMPLETED | OUTPATIENT
Start: 2021-09-29 | End: 2021-09-29

## 2021-09-29 RX ORDER — LIDOCAINE HYDROCHLORIDE 10 MG/ML
INJECTION, SOLUTION EPIDURAL; INFILTRATION; INTRACAUDAL; PERINEURAL
Status: DISCONTINUED | OUTPATIENT
Start: 2021-09-29 | End: 2021-09-29

## 2021-09-29 RX ORDER — SODIUM CHLORIDE, SODIUM LACTATE, POTASSIUM CHLORIDE, CALCIUM CHLORIDE 600; 310; 30; 20 MG/100ML; MG/100ML; MG/100ML; MG/100ML
INJECTION, SOLUTION INTRAVENOUS CONTINUOUS PRN
Status: DISCONTINUED | OUTPATIENT
Start: 2021-09-29 | End: 2021-09-29

## 2021-09-29 RX ORDER — ONDANSETRON 2 MG/ML
4 INJECTION INTRAMUSCULAR; INTRAVENOUS DAILY PRN
Status: DISCONTINUED | OUTPATIENT
Start: 2021-09-29 | End: 2021-09-29 | Stop reason: HOSPADM

## 2021-09-29 RX ORDER — MIDAZOLAM HYDROCHLORIDE 1 MG/ML
INJECTION INTRAMUSCULAR; INTRAVENOUS
Status: DISCONTINUED | OUTPATIENT
Start: 2021-09-29 | End: 2021-09-29

## 2021-09-29 RX ORDER — DIPHENHYDRAMINE HYDROCHLORIDE 50 MG/ML
12.5 INJECTION INTRAMUSCULAR; INTRAVENOUS
Status: DISCONTINUED | OUTPATIENT
Start: 2021-09-29 | End: 2021-09-29 | Stop reason: HOSPADM

## 2021-09-29 RX ORDER — CELECOXIB 100 MG/1
100 CAPSULE ORAL ONCE
Status: COMPLETED | OUTPATIENT
Start: 2021-09-29 | End: 2021-09-29

## 2021-09-29 RX ADMIN — PROPOFOL 10 MG: 10 INJECTION, EMULSION INTRAVENOUS at 07:09

## 2021-09-29 RX ADMIN — MIDAZOLAM HYDROCHLORIDE 2 MG: 1 INJECTION, SOLUTION INTRAMUSCULAR; INTRAVENOUS at 07:09

## 2021-09-29 RX ADMIN — CEFAZOLIN SODIUM 2 G: 2 SOLUTION INTRAVENOUS at 07:09

## 2021-09-29 RX ADMIN — PROPOFOL 20 MG: 10 INJECTION, EMULSION INTRAVENOUS at 08:09

## 2021-09-29 RX ADMIN — SODIUM CHLORIDE, SODIUM LACTATE, POTASSIUM CHLORIDE, AND CALCIUM CHLORIDE: .6; .31; .03; .02 INJECTION, SOLUTION INTRAVENOUS at 07:09

## 2021-09-29 RX ADMIN — CELECOXIB 100 MG: 100 CAPSULE ORAL at 06:09

## 2021-09-29 RX ADMIN — PROPOFOL 10 MG: 10 INJECTION, EMULSION INTRAVENOUS at 08:09

## 2021-09-29 RX ADMIN — PROPOFOL 20 MG: 10 INJECTION, EMULSION INTRAVENOUS at 07:09

## 2021-09-29 RX ADMIN — GABAPENTIN 100 MG: 100 CAPSULE ORAL at 06:09

## 2021-09-29 RX ADMIN — PROPOFOL 50 MG: 10 INJECTION, EMULSION INTRAVENOUS at 07:09

## 2021-09-29 RX ADMIN — ONDANSETRON 4 MG: 2 INJECTION INTRAMUSCULAR; INTRAVENOUS at 07:09

## 2021-09-29 RX ADMIN — LIDOCAINE HYDROCHLORIDE 50 MG: 10 INJECTION, SOLUTION EPIDURAL; INFILTRATION; INTRACAUDAL; PERINEURAL at 07:09

## 2021-10-19 ENCOUNTER — OFFICE VISIT (OUTPATIENT)
Dept: SURGERY | Facility: CLINIC | Age: 66
End: 2021-10-19
Payer: MEDICARE

## 2021-10-19 VITALS — HEIGHT: 61 IN | BODY MASS INDEX: 19.43 KG/M2 | TEMPERATURE: 97 F | WEIGHT: 102.94 LBS

## 2021-10-19 DIAGNOSIS — L72.9 BENIGN SKIN CYST: Primary | ICD-10-CM

## 2021-10-19 PROCEDURE — 1101F PR PT FALLS ASSESS DOC 0-1 FALLS W/OUT INJ PAST YR: ICD-10-PCS | Mod: CPTII,S$GLB,, | Performed by: SURGERY

## 2021-10-19 PROCEDURE — 99024 POSTOP FOLLOW-UP VISIT: CPT | Mod: S$GLB,,, | Performed by: SURGERY

## 2021-10-19 PROCEDURE — 1126F PR PAIN SEVERITY QUANTIFIED, NO PAIN PRESENT: ICD-10-PCS | Mod: CPTII,S$GLB,, | Performed by: SURGERY

## 2021-10-19 PROCEDURE — 3288F PR FALLS RISK ASSESSMENT DOCUMENTED: ICD-10-PCS | Mod: CPTII,S$GLB,, | Performed by: SURGERY

## 2021-10-19 PROCEDURE — 1159F MED LIST DOCD IN RCRD: CPT | Mod: CPTII,S$GLB,, | Performed by: SURGERY

## 2021-10-19 PROCEDURE — 99024 PR POST-OP FOLLOW-UP VISIT: ICD-10-PCS | Mod: S$GLB,,, | Performed by: SURGERY

## 2021-10-19 PROCEDURE — 3008F PR BODY MASS INDEX (BMI) DOCUMENTED: ICD-10-PCS | Mod: CPTII,S$GLB,, | Performed by: SURGERY

## 2021-10-19 PROCEDURE — 1126F AMNT PAIN NOTED NONE PRSNT: CPT | Mod: CPTII,S$GLB,, | Performed by: SURGERY

## 2021-10-19 PROCEDURE — 3008F BODY MASS INDEX DOCD: CPT | Mod: CPTII,S$GLB,, | Performed by: SURGERY

## 2021-10-19 PROCEDURE — 3288F FALL RISK ASSESSMENT DOCD: CPT | Mod: CPTII,S$GLB,, | Performed by: SURGERY

## 2021-10-19 PROCEDURE — 1101F PT FALLS ASSESS-DOCD LE1/YR: CPT | Mod: CPTII,S$GLB,, | Performed by: SURGERY

## 2021-10-19 PROCEDURE — 1160F PR REVIEW ALL MEDS BY PRESCRIBER/CLIN PHARMACIST DOCUMENTED: ICD-10-PCS | Mod: CPTII,S$GLB,, | Performed by: SURGERY

## 2021-10-19 PROCEDURE — 1160F RVW MEDS BY RX/DR IN RCRD: CPT | Mod: CPTII,S$GLB,, | Performed by: SURGERY

## 2021-10-19 PROCEDURE — 1159F PR MEDICATION LIST DOCUMENTED IN MEDICAL RECORD: ICD-10-PCS | Mod: CPTII,S$GLB,, | Performed by: SURGERY

## 2021-10-21 ENCOUNTER — OFFICE VISIT (OUTPATIENT)
Dept: FAMILY MEDICINE | Facility: CLINIC | Age: 66
End: 2021-10-21
Payer: MEDICARE

## 2021-10-21 VITALS
DIASTOLIC BLOOD PRESSURE: 62 MMHG | BODY MASS INDEX: 19.07 KG/M2 | HEIGHT: 61 IN | HEART RATE: 55 BPM | SYSTOLIC BLOOD PRESSURE: 104 MMHG | WEIGHT: 101 LBS

## 2021-10-21 DIAGNOSIS — F41.9 ANXIETY: ICD-10-CM

## 2021-10-21 DIAGNOSIS — N63.10 BREAST MASS, RIGHT: ICD-10-CM

## 2021-10-21 DIAGNOSIS — M51.9 LUMBAR DISC DISEASE: ICD-10-CM

## 2021-10-21 DIAGNOSIS — E78.00 PURE HYPERCHOLESTEROLEMIA: Primary | ICD-10-CM

## 2021-10-21 DIAGNOSIS — H10.10 ALLERGIC CONJUNCTIVITIS, UNSPECIFIED LATERALITY: ICD-10-CM

## 2021-10-21 DIAGNOSIS — M15.9 PRIMARY OSTEOARTHRITIS INVOLVING MULTIPLE JOINTS: ICD-10-CM

## 2021-10-21 DIAGNOSIS — R22.41 MASS OF RIGHT LOWER LEG: ICD-10-CM

## 2021-10-21 DIAGNOSIS — F51.01 PRIMARY INSOMNIA: ICD-10-CM

## 2021-10-21 DIAGNOSIS — M85.852 OSTEOPENIA OF NECKS OF BOTH FEMURS: ICD-10-CM

## 2021-10-21 DIAGNOSIS — M20.032 SWAN-NECK DEFORMITY OF FINGER, LEFT: ICD-10-CM

## 2021-10-21 DIAGNOSIS — H61.21 HEARING LOSS OF RIGHT EAR DUE TO CERUMEN IMPACTION: ICD-10-CM

## 2021-10-21 DIAGNOSIS — M85.851 OSTEOPENIA OF NECKS OF BOTH FEMURS: ICD-10-CM

## 2021-10-21 PROCEDURE — 99214 OFFICE O/P EST MOD 30 MIN: CPT | Mod: S$GLB,,, | Performed by: FAMILY MEDICINE

## 2021-10-21 PROCEDURE — 3008F PR BODY MASS INDEX (BMI) DOCUMENTED: ICD-10-PCS | Mod: S$GLB,,, | Performed by: FAMILY MEDICINE

## 2021-10-21 PROCEDURE — 1159F MED LIST DOCD IN RCRD: CPT | Mod: S$GLB,,, | Performed by: FAMILY MEDICINE

## 2021-10-21 PROCEDURE — 99214 PR OFFICE/OUTPT VISIT, EST, LEVL IV, 30-39 MIN: ICD-10-PCS | Mod: S$GLB,,, | Performed by: FAMILY MEDICINE

## 2021-10-21 PROCEDURE — 3078F DIAST BP <80 MM HG: CPT | Mod: S$GLB,,, | Performed by: FAMILY MEDICINE

## 2021-10-21 PROCEDURE — 3078F PR MOST RECENT DIASTOLIC BLOOD PRESSURE < 80 MM HG: ICD-10-PCS | Mod: S$GLB,,, | Performed by: FAMILY MEDICINE

## 2021-10-21 PROCEDURE — 3008F BODY MASS INDEX DOCD: CPT | Mod: S$GLB,,, | Performed by: FAMILY MEDICINE

## 2021-10-21 PROCEDURE — 1159F PR MEDICATION LIST DOCUMENTED IN MEDICAL RECORD: ICD-10-PCS | Mod: S$GLB,,, | Performed by: FAMILY MEDICINE

## 2021-10-21 PROCEDURE — 3074F PR MOST RECENT SYSTOLIC BLOOD PRESSURE < 130 MM HG: ICD-10-PCS | Mod: S$GLB,,, | Performed by: FAMILY MEDICINE

## 2021-10-21 PROCEDURE — 3074F SYST BP LT 130 MM HG: CPT | Mod: S$GLB,,, | Performed by: FAMILY MEDICINE

## 2021-10-21 RX ORDER — ZOLPIDEM TARTRATE 10 MG/1
10 TABLET ORAL NIGHTLY
Qty: 90 TABLET | Refills: 1 | Status: SHIPPED | OUTPATIENT
Start: 2021-10-21 | End: 2022-10-18 | Stop reason: SDUPTHER

## 2021-10-22 LAB
CHOLEST SERPL-MCNC: 237 MG/DL
CHOLEST/HDLC SERPL: 2.6 (CALC)
HDLC SERPL-MCNC: 92 MG/DL
LDLC SERPL CALC-MCNC: 122 MG/DL (CALC)
NONHDLC SERPL-MCNC: 145 MG/DL (CALC)
TRIGL SERPL-MCNC: 123 MG/DL

## 2021-10-25 ENCOUNTER — TELEPHONE (OUTPATIENT)
Dept: FAMILY MEDICINE | Facility: CLINIC | Age: 66
End: 2021-10-25
Payer: MEDICARE

## 2021-10-26 ENCOUNTER — OFFICE VISIT (OUTPATIENT)
Dept: OBSTETRICS AND GYNECOLOGY | Facility: CLINIC | Age: 66
End: 2021-10-26
Attending: OBSTETRICS & GYNECOLOGY
Payer: MEDICARE

## 2021-10-26 VITALS
SYSTOLIC BLOOD PRESSURE: 110 MMHG | DIASTOLIC BLOOD PRESSURE: 70 MMHG | BODY MASS INDEX: 19.15 KG/M2 | WEIGHT: 101.44 LBS | HEIGHT: 61 IN

## 2021-10-26 DIAGNOSIS — Z12.31 VISIT FOR SCREENING MAMMOGRAM: ICD-10-CM

## 2021-10-26 DIAGNOSIS — Z01.419 ENCOUNTER FOR GYNECOLOGICAL EXAMINATION (GENERAL) (ROUTINE) WITHOUT ABNORMAL FINDINGS: Primary | ICD-10-CM

## 2021-10-26 PROCEDURE — G0101 PR CA SCREEN;PELVIC/BREAST EXAM: ICD-10-PCS | Mod: S$GLB,,, | Performed by: OBSTETRICS & GYNECOLOGY

## 2021-10-26 PROCEDURE — 99999 PR PBB SHADOW E&M-EST. PATIENT-LVL III: CPT | Mod: PBBFAC,,, | Performed by: OBSTETRICS & GYNECOLOGY

## 2021-10-26 PROCEDURE — 1101F PT FALLS ASSESS-DOCD LE1/YR: CPT | Mod: CPTII,S$GLB,, | Performed by: OBSTETRICS & GYNECOLOGY

## 2021-10-26 PROCEDURE — 1125F PR PAIN SEVERITY QUANTIFIED, PAIN PRESENT: ICD-10-PCS | Mod: CPTII,S$GLB,, | Performed by: OBSTETRICS & GYNECOLOGY

## 2021-10-26 PROCEDURE — 3288F PR FALLS RISK ASSESSMENT DOCUMENTED: ICD-10-PCS | Mod: CPTII,S$GLB,, | Performed by: OBSTETRICS & GYNECOLOGY

## 2021-10-26 PROCEDURE — 3288F FALL RISK ASSESSMENT DOCD: CPT | Mod: CPTII,S$GLB,, | Performed by: OBSTETRICS & GYNECOLOGY

## 2021-10-26 PROCEDURE — 3078F PR MOST RECENT DIASTOLIC BLOOD PRESSURE < 80 MM HG: ICD-10-PCS | Mod: CPTII,S$GLB,, | Performed by: OBSTETRICS & GYNECOLOGY

## 2021-10-26 PROCEDURE — 3078F DIAST BP <80 MM HG: CPT | Mod: CPTII,S$GLB,, | Performed by: OBSTETRICS & GYNECOLOGY

## 2021-10-26 PROCEDURE — 99999 PR PBB SHADOW E&M-EST. PATIENT-LVL III: ICD-10-PCS | Mod: PBBFAC,,, | Performed by: OBSTETRICS & GYNECOLOGY

## 2021-10-26 PROCEDURE — 1125F AMNT PAIN NOTED PAIN PRSNT: CPT | Mod: CPTII,S$GLB,, | Performed by: OBSTETRICS & GYNECOLOGY

## 2021-10-26 PROCEDURE — 3008F BODY MASS INDEX DOCD: CPT | Mod: CPTII,S$GLB,, | Performed by: OBSTETRICS & GYNECOLOGY

## 2021-10-26 PROCEDURE — 1159F PR MEDICATION LIST DOCUMENTED IN MEDICAL RECORD: ICD-10-PCS | Mod: CPTII,S$GLB,, | Performed by: OBSTETRICS & GYNECOLOGY

## 2021-10-26 PROCEDURE — 3008F PR BODY MASS INDEX (BMI) DOCUMENTED: ICD-10-PCS | Mod: CPTII,S$GLB,, | Performed by: OBSTETRICS & GYNECOLOGY

## 2021-10-26 PROCEDURE — 1101F PR PT FALLS ASSESS DOC 0-1 FALLS W/OUT INJ PAST YR: ICD-10-PCS | Mod: CPTII,S$GLB,, | Performed by: OBSTETRICS & GYNECOLOGY

## 2021-10-26 PROCEDURE — 1159F MED LIST DOCD IN RCRD: CPT | Mod: CPTII,S$GLB,, | Performed by: OBSTETRICS & GYNECOLOGY

## 2021-10-26 PROCEDURE — 1160F PR REVIEW ALL MEDS BY PRESCRIBER/CLIN PHARMACIST DOCUMENTED: ICD-10-PCS | Mod: CPTII,S$GLB,, | Performed by: OBSTETRICS & GYNECOLOGY

## 2021-10-26 PROCEDURE — G0101 CA SCREEN;PELVIC/BREAST EXAM: HCPCS | Mod: S$GLB,,, | Performed by: OBSTETRICS & GYNECOLOGY

## 2021-10-26 PROCEDURE — 3074F SYST BP LT 130 MM HG: CPT | Mod: CPTII,S$GLB,, | Performed by: OBSTETRICS & GYNECOLOGY

## 2021-10-26 PROCEDURE — 1160F RVW MEDS BY RX/DR IN RCRD: CPT | Mod: CPTII,S$GLB,, | Performed by: OBSTETRICS & GYNECOLOGY

## 2021-10-26 PROCEDURE — 3074F PR MOST RECENT SYSTOLIC BLOOD PRESSURE < 130 MM HG: ICD-10-PCS | Mod: CPTII,S$GLB,, | Performed by: OBSTETRICS & GYNECOLOGY

## 2021-11-03 ENCOUNTER — CLINICAL SUPPORT (OUTPATIENT)
Dept: FAMILY MEDICINE | Facility: CLINIC | Age: 66
End: 2021-11-03
Payer: MEDICARE

## 2021-11-03 DIAGNOSIS — Z23 NEED FOR 23-POLYVALENT PNEUMOCOCCAL POLYSACCHARIDE VACCINE: Primary | ICD-10-CM

## 2021-11-03 PROCEDURE — 90732 PNEUMOCOCCAL POLYSACCHARIDE VACCINE 23-VALENT =>2YO SQ IM: ICD-10-PCS | Mod: S$GLB,,, | Performed by: NURSE PRACTITIONER

## 2021-11-03 PROCEDURE — G0009 ADMIN PNEUMOCOCCAL VACCINE: HCPCS | Mod: S$GLB,,, | Performed by: NURSE PRACTITIONER

## 2021-11-03 PROCEDURE — 90732 PPSV23 VACC 2 YRS+ SUBQ/IM: CPT | Mod: S$GLB,,, | Performed by: NURSE PRACTITIONER

## 2021-11-03 PROCEDURE — G0009 PNEUMOCOCCAL POLYSACCHARIDE VACCINE 23-VALENT =>2YO SQ IM: ICD-10-PCS | Mod: S$GLB,,, | Performed by: NURSE PRACTITIONER

## 2022-02-17 DIAGNOSIS — F41.9 ANXIETY: ICD-10-CM

## 2022-02-17 RX ORDER — SERTRALINE HYDROCHLORIDE 25 MG/1
25 TABLET, FILM COATED ORAL NIGHTLY
Qty: 90 TABLET | Refills: 1 | Status: SHIPPED | OUTPATIENT
Start: 2022-02-17 | End: 2022-07-25 | Stop reason: SDUPTHER

## 2022-04-26 NOTE — PROGRESS NOTES
Subjective:       Patient ID: Nika Smith is a 67 y.o. female.    Chief Complaint: Post-op Evaluation      HPI:  Status post excision of right lower extremity lesion.  Pathology has returned hemorrhagic pseudocyst.  She is feeling well.  No complaints.  SOFT TISSUE, RIGHT LOWER EXTREMITY MASS, EXCISION:   --HEMORRHAGIC PSEUDOCYST.     Review of Systems    Objective:      Physical Exam  Constitutional:       General: She is not in acute distress.  Pulmonary:      Effort: Pulmonary effort is normal. No respiratory distress.   Skin:     Comments: Incision clean dry intact.   Neurological:      Mental Status: She is alert and oriented to person, place, and time.         Assessment/Plan:   Hemorrhagic pseudocyst of right lower extremity    -status post excision.  Path benign.  Doing well.  Return clinic p.r.n..

## 2022-05-04 ENCOUNTER — TELEPHONE (OUTPATIENT)
Dept: FAMILY MEDICINE | Facility: CLINIC | Age: 67
End: 2022-05-04

## 2022-05-04 NOTE — TELEPHONE ENCOUNTER
[facility] 4/25/2022  7:52 AM CDT -----    ----- Message -----  From: Shantel Workman MA  Sent: 10/25/2021   5:12 PM CDT  To: Celio Birmingham Staff    ----- Message from Celio Birmingham MD sent at 10/24/2021  7:52 PM CDT -----  Call patient.  Total cholesterol is mildly elevated at 237 but HDL (good cholesterol) is excellent at 92. LDL (bad cholesterol) mild elevation at 122.  Continue exercise regimen and low-fat diet.  In 6 months recheck CMP lipids CBC

## 2022-05-18 ENCOUNTER — TELEPHONE (OUTPATIENT)
Dept: FAMILY MEDICINE | Facility: CLINIC | Age: 67
End: 2022-05-18

## 2022-05-18 DIAGNOSIS — Z79.899 ENCOUNTER FOR LONG-TERM (CURRENT) USE OF OTHER MEDICATIONS: ICD-10-CM

## 2022-05-18 DIAGNOSIS — E78.00 PURE HYPERCHOLESTEROLEMIA: Primary | ICD-10-CM

## 2022-05-18 NOTE — TELEPHONE ENCOUNTER
----- [facility] at 4/25/2022  7:52 AM CDT -----    ----- Message -----  From: Shantel Workman MA  Sent: 10/25/2021   5:12 PM CDT  To: Celio Birmingham Staff    ----- Message from Celio Birmingham MD sent at 10/24/2021  7:52 PM CDT -----  Call patient.  Total cholesterol is mildly elevated at 237 but HDL (good cholesterol) is excellent at 92. LDL (bad cholesterol) mild elevation at 122.  Continue exercise regimen and low-fat diet.  In 6 months recheck CMP lipids CBC

## 2022-05-21 LAB
ALBUMIN SERPL-MCNC: 4.2 G/DL (ref 3.6–5.1)
ALBUMIN/GLOB SERPL: 1.7 (CALC) (ref 1–2.5)
ALP SERPL-CCNC: 75 U/L (ref 37–153)
ALT SERPL-CCNC: 12 U/L (ref 6–29)
AST SERPL-CCNC: 20 U/L (ref 10–35)
BASOPHILS # BLD AUTO: 42 CELLS/UL (ref 0–200)
BASOPHILS NFR BLD AUTO: 1 %
BILIRUB SERPL-MCNC: 0.5 MG/DL (ref 0.2–1.2)
BUN SERPL-MCNC: 19 MG/DL (ref 7–25)
BUN/CREAT SERPL: NORMAL (CALC) (ref 6–22)
CALCIUM SERPL-MCNC: 9.5 MG/DL (ref 8.6–10.4)
CHLORIDE SERPL-SCNC: 103 MMOL/L (ref 98–110)
CHOLEST SERPL-MCNC: 240 MG/DL
CHOLEST/HDLC SERPL: 2.6 (CALC)
CO2 SERPL-SCNC: 28 MMOL/L (ref 20–32)
CREAT SERPL-MCNC: 0.81 MG/DL (ref 0.5–0.99)
EOSINOPHIL # BLD AUTO: 50 CELLS/UL (ref 15–500)
EOSINOPHIL NFR BLD AUTO: 1.2 %
ERYTHROCYTE [DISTWIDTH] IN BLOOD BY AUTOMATED COUNT: 13 % (ref 11–15)
GLOBULIN SER CALC-MCNC: 2.5 G/DL (CALC) (ref 1.9–3.7)
GLUCOSE SERPL-MCNC: 92 MG/DL (ref 65–99)
HCT VFR BLD AUTO: 43.9 % (ref 35–45)
HDLC SERPL-MCNC: 93 MG/DL
HGB BLD-MCNC: 14 G/DL (ref 11.7–15.5)
LDLC SERPL CALC-MCNC: 123 MG/DL (CALC)
LYMPHOCYTES # BLD AUTO: 1751 CELLS/UL (ref 850–3900)
LYMPHOCYTES NFR BLD AUTO: 41.7 %
MCH RBC QN AUTO: 30.2 PG (ref 27–33)
MCHC RBC AUTO-ENTMCNC: 31.9 G/DL (ref 32–36)
MCV RBC AUTO: 94.8 FL (ref 80–100)
MONOCYTES # BLD AUTO: 315 CELLS/UL (ref 200–950)
MONOCYTES NFR BLD AUTO: 7.5 %
NEUTROPHILS # BLD AUTO: 2041 CELLS/UL (ref 1500–7800)
NEUTROPHILS NFR BLD AUTO: 48.6 %
NONHDLC SERPL-MCNC: 147 MG/DL (CALC)
PLATELET # BLD AUTO: 202 THOUSAND/UL (ref 140–400)
PMV BLD REES-ECKER: 11.4 FL (ref 7.5–12.5)
POTASSIUM SERPL-SCNC: 4.7 MMOL/L (ref 3.5–5.3)
PROT SERPL-MCNC: 6.7 G/DL (ref 6.1–8.1)
RBC # BLD AUTO: 4.63 MILLION/UL (ref 3.8–5.1)
SODIUM SERPL-SCNC: 141 MMOL/L (ref 135–146)
TRIGL SERPL-MCNC: 129 MG/DL
WBC # BLD AUTO: 4.2 THOUSAND/UL (ref 3.8–10.8)

## 2022-05-23 NOTE — PROGRESS NOTES
Call patient.  Her CBC shows no anemia.  Sugar kidneys liver all look normal.  Cholesterol is rising higher up to 240 now, HDL still high at 93 LDL cholesterol moderately elevated at 123. We can get cholesterol back to normal if we add rosuvastatin 5 mg 3 days a week.  Is she interested in doing this?  If so call in the prescription and recheck CMP lipids in 3 months

## 2022-05-24 ENCOUNTER — TELEPHONE (OUTPATIENT)
Dept: FAMILY MEDICINE | Facility: CLINIC | Age: 67
End: 2022-05-24

## 2022-05-24 NOTE — TELEPHONE ENCOUNTER
----- Message from Celio Birmingham MD sent at 5/22/2022  8:50 PM CDT -----  Call patient.  Her CBC shows no anemia.  Sugar kidneys liver all look normal.  Cholesterol is rising higher up to 240 now, HDL still high at 93 LDL cholesterol moderately elevated at 123. We can get cholesterol back to normal if we add rosuvastatin 5 mg 3 days a week.  Is she interested in doing this?  If so call in the prescription and recheck CMP lipids in 3 months

## 2022-05-24 NOTE — TELEPHONE ENCOUNTER
Spoke with pt who refused cholesterol medication - states she spoke with her cardiologist and they are handling this.

## 2022-07-05 DIAGNOSIS — R00.2 PALPITATIONS: Primary | ICD-10-CM

## 2022-07-12 ENCOUNTER — TELEPHONE (OUTPATIENT)
Dept: FAMILY MEDICINE | Facility: CLINIC | Age: 67
End: 2022-07-12

## 2022-07-12 NOTE — TELEPHONE ENCOUNTER
----- Message from Ana Peck MA sent at 7/12/2022  9:51 AM CDT -----  Regarding: FW: envelope    ----- Message -----  From: Kemi Zavala  Sent: 7/12/2022   9:19 AM CDT  To: Deep Anderson Staff  Subject: envelope                                         Pt brought  in an envelope for Dr Birmingham.  Gave to Shantel

## 2022-07-25 DIAGNOSIS — F41.9 ANXIETY: ICD-10-CM

## 2022-07-25 RX ORDER — SERTRALINE HYDROCHLORIDE 25 MG/1
25 TABLET, FILM COATED ORAL NIGHTLY
Qty: 90 TABLET | Refills: 1 | Status: SHIPPED | OUTPATIENT
Start: 2022-07-25 | End: 2022-10-18 | Stop reason: SDUPTHER

## 2022-07-25 NOTE — TELEPHONE ENCOUNTER
----- Message from Kemi Zavala sent at 7/25/2022  1:31 PM CDT -----  Regarding: refill  Please refill Sertraline send to [facility]

## 2022-10-18 ENCOUNTER — OFFICE VISIT (OUTPATIENT)
Dept: FAMILY MEDICINE | Facility: CLINIC | Age: 67
End: 2022-10-18
Payer: MEDICARE

## 2022-10-18 VITALS
SYSTOLIC BLOOD PRESSURE: 114 MMHG | HEART RATE: 55 BPM | HEIGHT: 61 IN | BODY MASS INDEX: 19.83 KG/M2 | DIASTOLIC BLOOD PRESSURE: 68 MMHG | WEIGHT: 105 LBS

## 2022-10-18 DIAGNOSIS — M85.851 OSTEOPENIA OF NECKS OF BOTH FEMURS: ICD-10-CM

## 2022-10-18 DIAGNOSIS — E78.00 PURE HYPERCHOLESTEROLEMIA: Primary | ICD-10-CM

## 2022-10-18 DIAGNOSIS — F41.9 ANXIETY: ICD-10-CM

## 2022-10-18 DIAGNOSIS — F51.01 PRIMARY INSOMNIA: ICD-10-CM

## 2022-10-18 DIAGNOSIS — M15.9 PRIMARY OSTEOARTHRITIS INVOLVING MULTIPLE JOINTS: ICD-10-CM

## 2022-10-18 DIAGNOSIS — M85.852 OSTEOPENIA OF NECKS OF BOTH FEMURS: ICD-10-CM

## 2022-10-18 DIAGNOSIS — Z12.31 OTHER SCREENING MAMMOGRAM: ICD-10-CM

## 2022-10-18 DIAGNOSIS — Z78.0 MENOPAUSE: ICD-10-CM

## 2022-10-18 PROCEDURE — 99214 OFFICE O/P EST MOD 30 MIN: CPT | Mod: S$GLB,,, | Performed by: FAMILY MEDICINE

## 2022-10-18 PROCEDURE — 3074F SYST BP LT 130 MM HG: CPT | Mod: CPTII,S$GLB,, | Performed by: FAMILY MEDICINE

## 2022-10-18 PROCEDURE — 1159F MED LIST DOCD IN RCRD: CPT | Mod: CPTII,S$GLB,, | Performed by: FAMILY MEDICINE

## 2022-10-18 PROCEDURE — 1159F PR MEDICATION LIST DOCUMENTED IN MEDICAL RECORD: ICD-10-PCS | Mod: CPTII,S$GLB,, | Performed by: FAMILY MEDICINE

## 2022-10-18 PROCEDURE — 3078F DIAST BP <80 MM HG: CPT | Mod: CPTII,S$GLB,, | Performed by: FAMILY MEDICINE

## 2022-10-18 PROCEDURE — 3074F PR MOST RECENT SYSTOLIC BLOOD PRESSURE < 130 MM HG: ICD-10-PCS | Mod: CPTII,S$GLB,, | Performed by: FAMILY MEDICINE

## 2022-10-18 PROCEDURE — 99214 PR OFFICE/OUTPT VISIT, EST, LEVL IV, 30-39 MIN: ICD-10-PCS | Mod: S$GLB,,, | Performed by: FAMILY MEDICINE

## 2022-10-18 PROCEDURE — 3078F PR MOST RECENT DIASTOLIC BLOOD PRESSURE < 80 MM HG: ICD-10-PCS | Mod: CPTII,S$GLB,, | Performed by: FAMILY MEDICINE

## 2022-10-18 RX ORDER — SERTRALINE HYDROCHLORIDE 25 MG/1
25 TABLET, FILM COATED ORAL NIGHTLY
Qty: 90 TABLET | Refills: 1 | Status: SHIPPED | OUTPATIENT
Start: 2022-10-18 | End: 2024-02-13 | Stop reason: SDUPTHER

## 2022-10-18 RX ORDER — ZOLPIDEM TARTRATE 10 MG/1
10 TABLET ORAL NIGHTLY
Qty: 90 TABLET | Refills: 1 | Status: SHIPPED | OUTPATIENT
Start: 2022-10-18 | End: 2022-10-18 | Stop reason: SDUPTHER

## 2022-10-18 RX ORDER — ZOLPIDEM TARTRATE 10 MG/1
TABLET ORAL
Qty: 90 TABLET | Refills: 0 | OUTPATIENT
Start: 2022-10-18

## 2022-10-18 RX ORDER — ZOLPIDEM TARTRATE 10 MG/1
10 TABLET ORAL NIGHTLY
Qty: 90 TABLET | Refills: 1 | Status: SHIPPED | OUTPATIENT
Start: 2022-10-18 | End: 2023-05-02 | Stop reason: SDUPTHER

## 2022-10-18 NOTE — PROGRESS NOTES
SUBJECTIVE:    Patient ID: Nika Smith is a 67 y.o. female.    Chief Complaint: Annual Exam (No bottles, discuss about medication, Pt has flu shot, declined Dexa,abc )    This 67-year-old female walks in her neighborhood for 1 hour 5 days a week.  She also can take a 12-15 minute bike ride for exercise.  Yoga classes 2 times a week.    Osteoarthritis-noticed that in her fingers, left knee also has some pain.  January 2022 she had a meniscus repair with Dr. Marco Rodriguez[facility].  She gets monovisc injections to the left knee q.6 months.  She is not on any NSAIDs or pain medications for arthritis.    20/20-colonoscopy with Dr. Malhotra[facility] 3 years.  Patient's father had colon cancer.    Mammogram done in 11/2021, her new gyn will be Dr. Edwards    Gets some urinary urgency occasionally.    Anxiety-control with Zoloft 25 mg daily.    Primary insomnia -zolpidem 10 mg, 2 to 3 times a week was trying to get off of it but could not quite do it.    Hyperlipidemia-sees Dr. Albin Arellano cardiology, he felt her lipid ratio was good even though she has-elevated cholesterol in the 220s to 240s he did not recommend starting a statin drug.      Telephone on 05/18/2022   Component Date Value Ref Range Status    WBC 05/20/2022 4.2  3.8 - 10.8 Thousand/uL Final    RBC 05/20/2022 4.63  3.80 - 5.10 Million/uL Final    Hemoglobin 05/20/2022 14.0  11.7 - 15.5 g/dL Final    Hematocrit 05/20/2022 43.9  35.0 - 45.0 % Final    MCV 05/20/2022 94.8  80.0 - 100.0 fL Final    MCH 05/20/2022 30.2  27.0 - 33.0 pg Final    MCHC 05/20/2022 31.9 (L)  32.0 - 36.0 g/dL Final    RDW 05/20/2022 13.0  11.0 - 15.0 % Final    Platelets 05/20/2022 202  140 - 400 Thousand/uL Final    MPV 05/20/2022 11.4  7.5 - 12.5 fL Final    Neutrophils, Abs 05/20/2022 2,041  1,500 - 7,800 cells/uL Final    Lymph # 05/20/2022 1,751  850 - 3,900 cells/uL Final    Mono # 05/20/2022 315  200 - 950 cells/uL Final    Eos # 05/20/2022 50  15 - 500 cells/uL Final    Baso  # 05/20/2022 42  0 - 200 cells/uL Final    Neutrophils Relative 05/20/2022 48.6  % Final    Lymph % 05/20/2022 41.7  % Final    Mono % 05/20/2022 7.5  % Final    Eosinophil % 05/20/2022 1.2  % Final    Basophil % 05/20/2022 1.0  % Final    Glucose 05/20/2022 92  65 - 99 mg/dL Final    BUN 05/20/2022 19  7 - 25 mg/dL Final    Creatinine 05/20/2022 0.81  0.50 - 0.99 mg/dL Final    eGFR if non African American 05/20/2022 74  > OR = 60 mL/min/1.73m2 Final    eGFR if  05/20/2022 86  > OR = 60 mL/min/1.73m2 Final    BUN/Creatinine Ratio 05/20/2022 NOT APPLICABLE  6 - 22 (calc) Final    Sodium 05/20/2022 141  135 - 146 mmol/L Final    Potassium 05/20/2022 4.7  3.5 - 5.3 mmol/L Final    Chloride 05/20/2022 103  98 - 110 mmol/L Final    CO2 05/20/2022 28  20 - 32 mmol/L Final    Calcium 05/20/2022 9.5  8.6 - 10.4 mg/dL Final    Total Protein 05/20/2022 6.7  6.1 - 8.1 g/dL Final    Albumin 05/20/2022 4.2  3.6 - 5.1 g/dL Final    Globulin, Total 05/20/2022 2.5  1.9 - 3.7 g/dL (calc) Final    Albumin/Globulin Ratio 05/20/2022 1.7  1.0 - 2.5 (calc) Final    Total Bilirubin 05/20/2022 0.5  0.2 - 1.2 mg/dL Final    Alkaline Phosphatase 05/20/2022 75  37 - 153 U/L Final    AST 05/20/2022 20  10 - 35 U/L Final    ALT 05/20/2022 12  6 - 29 U/L Final    Cholesterol 05/20/2022 240 (H)  <200 mg/dL Final    HDL 05/20/2022 93  > OR = 50 mg/dL Final    Triglycerides 05/20/2022 129  <150 mg/dL Final    LDL Cholesterol 05/20/2022 123 (H)  mg/dL (calc) Final    HDL/Cholesterol Ratio 05/20/2022 2.6  <5.0 (calc) Final    Non HDL Chol. (LDL+VLDL) 05/20/2022 147 (H)  <130 mg/dL (calc) Final       Past Medical History:   Diagnosis Date    Abnormal Pap smear of cervix 1989    ASCUS favor reactive, repeat nl    Anxiety     Heart murmur Yes    History of osteopenia     hip    Insomnia     Menopause 2005    Mitral valve prolapse     Osteoporosis Yes     Social History     Socioeconomic History    Marital status:    Tobacco Use     Smoking status: Former     Packs/day: 0.00     Years: 5.00     Pack years: 0.00     Types: Cigarettes     Quit date:      Years since quittin.9    Smokeless tobacco: Never   Substance and Sexual Activity    Alcohol use: Yes     Alcohol/week: 14.0 standard drinks     Types: 14 Glasses of wine per week    Drug use: No    Sexual activity: Not Currently     Partners: Male     Past Surgical History:   Procedure Laterality Date    COLONOSCOPY      Dr Alfaro 5 yr    COLONOSCOPY      Dr Hung parra 3 yrs    COLPOSCOPY      DILATION AND CURETTAGE OF UTERUS      menorrhagia, path benign    EXCISION OF MASS OF EXTREMITY Right     pseudo cyst  right leg    EYE SURGERY      Cataracts-Dr Westbrook    SURGICAL REMOVAL OF MASS OF LOWER EXTREMITY Right 2021    Procedure: EXCISION, MASS, LOWER EXTREMITY;  Surgeon: Jaison Beckman III, MD;  Location: [facility];  Service: General;  Laterality: Right;     Family History   Problem Relation Age of Onset    Diabetes Father     Hypertension Father     Heart attack Father     Coronary artery disease Father     Cancer Father     Osteoarthritis Mother     Breast cancer Sister 65    Cancer Sister     Hypertension Brother        Review of patient's allergies indicates:  No Known Allergies    Current Outpatient Medications:     calcium citrate-vitamin D3 200 mg calcium -250 unit Tab, Take 3 tablets by mouth once daily., Disp: , Rfl:     EYE ITCH RELIEF 0.025 % (0.035 %) ophthalmic solution, , Disp: , Rfl:     FLAXSEED OIL MISC, , Disp: , Rfl:     glucosam-MSM-chond-hyaluron ac 176--1 mg Tab, Take 2 tablets by mouth once daily., Disp: , Rfl:     mineral oil/petrolatum,white (LUBRICANT EYE OPHT), , Disp: , Rfl:     multivit with minerals/lutein (MULTIVITAMIN 50 PLUS ORAL), , Disp: , Rfl:     sertraline (ZOLOFT) 25 MG tablet, Take 1 tablet (25 mg total) by mouth every evening., Disp: 90 tablet, Rfl: 1    zolpidem (AMBIEN) 10 mg Tab, Take 1 tablet (10 mg  "total) by mouth every evening., Disp: 90 tablet, Rfl: 1    Review of Systems   Constitutional:  Negative for appetite change, chills, fatigue, fever and unexpected weight change.   HENT:  Negative for congestion, ear pain, sinus pain, sore throat and trouble swallowing.    Eyes:  Negative for pain, discharge and visual disturbance.   Respiratory:  Negative for apnea, cough, shortness of breath and wheezing.    Cardiovascular:  Negative for chest pain, palpitations and leg swelling.   Gastrointestinal:  Negative for abdominal pain, blood in stool, constipation, diarrhea, nausea and vomiting.   Endocrine: Negative for heat intolerance, polydipsia and polyuria.   Genitourinary:  Negative for difficulty urinating, dyspareunia, dysuria, frequency, hematuria and menstrual problem.   Musculoskeletal:  Positive for arthralgias (left knee pain). Negative for back pain, gait problem, joint swelling and myalgias.   Allergic/Immunologic: Negative for environmental allergies, food allergies and immunocompromised state.   Neurological:  Negative for dizziness, tremors, seizures, numbness and headaches.   Psychiatric/Behavioral:  Positive for sleep disturbance. Negative for behavioral problems, confusion, hallucinations and suicidal ideas. The patient is nervous/anxious.         Objective:      Vitals:    10/18/22 0840   BP: 114/68   Pulse: (!) 55   Weight: 47.6 kg (105 lb)   Height: 5' 1" (1.549 m)     Physical Exam  Vitals and nursing note reviewed.   Constitutional:       General: She is not in acute distress.     Appearance: Normal appearance. She is well-developed and normal weight. She is not toxic-appearing.   HENT:      Head: Normocephalic and atraumatic.      Right Ear: Tympanic membrane and external ear normal.      Left Ear: Tympanic membrane and external ear normal.      Nose: Nose normal.      Mouth/Throat:      Pharynx: Oropharynx is clear.   Eyes:      Pupils: Pupils are equal, round, and reactive to light.   Neck: "      Thyroid: No thyromegaly.      Vascular: No carotid bruit.   Cardiovascular:      Rate and Rhythm: Normal rate and regular rhythm.      Heart sounds: Normal heart sounds. No murmur heard.  Pulmonary:      Effort: Pulmonary effort is normal.      Breath sounds: Normal breath sounds. No wheezing or rales.   Abdominal:      General: Bowel sounds are normal. There is no distension.      Palpations: Abdomen is soft.      Tenderness: There is no abdominal tenderness.   Musculoskeletal:         General: No tenderness or deformity. Normal range of motion.      Cervical back: Normal range of motion and neck supple.      Lumbar back: Normal. No spasms.      Comments: Bends 90 degrees at  waist, crepitant knees bilaterally, good shoulder range of motion.  No pitting edema to the lower extremities.   Lymphadenopathy:      Cervical: No cervical adenopathy.   Skin:     General: Skin is warm and dry.      Findings: No rash.   Neurological:      General: No focal deficit present.      Mental Status: She is alert and oriented to person, place, and time. Mental status is at baseline.      Cranial Nerves: No cranial nerve deficit.      Coordination: Coordination normal.   Psychiatric:         Mood and Affect: Mood normal.         Behavior: Behavior normal.         Thought Content: Thought content normal.         Judgment: Judgment normal.         Assessment:       1. Pure hypercholesterolemia    2. Other screening mammogram    3. Anxiety    4. Primary insomnia    5. Primary osteoarthritis involving multiple joints    6. Osteopenia of necks of both femurs    7. Menopause         Plan:       Pure hypercholesterolemia  -     Cancel: Lipid Panel; Future; Expected date: 10/18/2022  -     Lipid Panel; Future; Expected date: 10/18/2022  Will repeat lipid panel and give a copy to Dr. Arellano  Other screening mammogram  -     Mammo Digital Screening Bilat w/ Tom; Future; Expected date: 10/18/2022  Mammogram ordered  Anxiety  -      sertraline (ZOLOFT) 25 MG tablet; Take 1 tablet (25 mg total) by mouth every evening.  Dispense: 90 tablet; Refill: 1  Continue sertraline doing well  Primary insomnia  -     zolpidem (AMBIEN) 10 mg Tab; Take 1 tablet (10 mg total) by mouth every evening.  Dispense: 90 tablet; Refill: 1  Continue zolpidem, did not tolerate trazodone  Primary osteoarthritis involving multiple joints  Not on NSAIDs at this time  Osteopenia of necks of both femurs    Menopause    No follow-ups on file.        10/18/2022 Celio Birmingham         resilient/elastic

## 2022-10-20 LAB
CHOLEST SERPL-MCNC: 246 MG/DL
CHOLEST/HDLC SERPL: 3 (CALC)
HDLC SERPL-MCNC: 83 MG/DL
LDLC SERPL CALC-MCNC: 137 MG/DL (CALC)
NONHDLC SERPL-MCNC: 163 MG/DL (CALC)
TRIGL SERPL-MCNC: 140 MG/DL

## 2022-10-24 ENCOUNTER — TELEPHONE (OUTPATIENT)
Dept: FAMILY MEDICINE | Facility: CLINIC | Age: 67
End: 2022-10-24

## 2022-10-24 NOTE — TELEPHONE ENCOUNTER
----- Message from Celio Birmingham MD sent at 10/23/2022  8:40 PM CDT -----  Call patient.  Cholesterol slightly high at 246-triglycerides 140, we will fax a copy of this to Dr. Arellano for you.  Continue low-fat diet and exercise.

## 2022-10-24 NOTE — TELEPHONE ENCOUNTER
----- Message from Kemi Zavala sent at 10/24/2022 11:37 AM CDT -----  Regarding: envelope  Pt brought in envelope for Dr. Birmingham.  Gave to Shantel

## 2022-10-24 NOTE — PROGRESS NOTES
Call patient.  Cholesterol slightly high at 246-triglycerides 140, we will fax a copy of this to Dr. Arellano for you.  Continue low-fat diet and exercise.

## 2022-10-25 ENCOUNTER — PATIENT MESSAGE (OUTPATIENT)
Dept: FAMILY MEDICINE | Facility: CLINIC | Age: 67
End: 2022-10-25

## 2022-10-25 ENCOUNTER — TELEPHONE (OUTPATIENT)
Dept: FAMILY MEDICINE | Facility: CLINIC | Age: 67
End: 2022-10-25

## 2023-05-02 DIAGNOSIS — F51.01 PRIMARY INSOMNIA: ICD-10-CM

## 2023-05-02 RX ORDER — ZOLPIDEM TARTRATE 10 MG/1
10 TABLET ORAL NIGHTLY
Qty: 90 TABLET | Refills: 1 | Status: SHIPPED | OUTPATIENT
Start: 2023-05-02 | End: 2023-06-21 | Stop reason: SDUPTHER

## 2023-05-02 NOTE — TELEPHONE ENCOUNTER
----- Message from Isamar Turner sent at 5/2/2023 12:15 PM CDT -----  Patient called and stated that she need a refill of her zolpidem called into [facility] if any questions please give her a call at [phone]

## 2023-05-22 ENCOUNTER — TELEPHONE (OUTPATIENT)
Dept: FAMILY MEDICINE | Facility: CLINIC | Age: 68
End: 2023-05-22

## 2023-05-22 ENCOUNTER — PATIENT MESSAGE (OUTPATIENT)
Dept: FAMILY MEDICINE | Facility: CLINIC | Age: 68
End: 2023-05-22

## 2023-05-22 NOTE — TELEPHONE ENCOUNTER
Spoke with patient who states she found 2 lumps under her skin on her right leg. No redness or pain.   One is the size of a dime and the other is smaller.   Not causing her any discomfort but has had cysts removed before. Will send us a picture on the portal because she states they are visible.

## 2023-05-22 NOTE — TELEPHONE ENCOUNTER
----- Message from Edison Archer MA sent at 5/22/2023 12:22 PM CDT -----  Regarding: appt request  Pt calling to be seen for 2 lumps on her lower right leg that have been present for a few weeks.

## 2023-06-21 DIAGNOSIS — F51.01 PRIMARY INSOMNIA: ICD-10-CM

## 2023-06-21 RX ORDER — ZOLPIDEM TARTRATE 10 MG/1
10 TABLET ORAL NIGHTLY
Qty: 90 TABLET | Refills: 1 | Status: SHIPPED | OUTPATIENT
Start: 2023-06-21 | End: 2024-01-07 | Stop reason: SDUPTHER

## 2023-06-21 NOTE — TELEPHONE ENCOUNTER
----- Message from Aarti Talamantes sent at 6/21/2023 10:04 AM CDT -----  Refill for zolpidem. [facility]. Pt #[phone]

## 2023-10-19 ENCOUNTER — OFFICE VISIT (OUTPATIENT)
Dept: FAMILY MEDICINE | Facility: CLINIC | Age: 68
End: 2023-10-19
Payer: MEDICARE

## 2023-10-19 VITALS
DIASTOLIC BLOOD PRESSURE: 60 MMHG | SYSTOLIC BLOOD PRESSURE: 114 MMHG | HEART RATE: 70 BPM | HEIGHT: 61 IN | WEIGHT: 102 LBS | BODY MASS INDEX: 19.26 KG/M2

## 2023-10-19 DIAGNOSIS — E78.00 PURE HYPERCHOLESTEROLEMIA: Primary | ICD-10-CM

## 2023-10-19 DIAGNOSIS — M85.851 OSTEOPENIA OF NECKS OF BOTH FEMURS: ICD-10-CM

## 2023-10-19 DIAGNOSIS — Z13.820 ENCOUNTER FOR IMAGING TO ASSESS OSTEOPOROSIS: ICD-10-CM

## 2023-10-19 DIAGNOSIS — F51.01 PRIMARY INSOMNIA: ICD-10-CM

## 2023-10-19 DIAGNOSIS — M85.852 OSTEOPENIA OF NECKS OF BOTH FEMURS: ICD-10-CM

## 2023-10-19 DIAGNOSIS — F41.9 ANXIETY: ICD-10-CM

## 2023-10-19 DIAGNOSIS — E78.2 MIXED HYPERLIPIDEMIA: ICD-10-CM

## 2023-10-19 DIAGNOSIS — M15.9 PRIMARY OSTEOARTHRITIS INVOLVING MULTIPLE JOINTS: ICD-10-CM

## 2023-10-19 DIAGNOSIS — I34.1 MITRAL VALVE PROLAPSE SYNDROME: ICD-10-CM

## 2023-10-19 DIAGNOSIS — Z12.31 SCREENING MAMMOGRAM FOR HIGH-RISK PATIENT: ICD-10-CM

## 2023-10-19 DIAGNOSIS — Z78.0 MENOPAUSE: ICD-10-CM

## 2023-10-19 DIAGNOSIS — M51.9 LUMBAR DISC DISEASE: ICD-10-CM

## 2023-10-19 PROCEDURE — 99214 OFFICE O/P EST MOD 30 MIN: CPT | Mod: S$GLB,,, | Performed by: FAMILY MEDICINE

## 2023-10-19 PROCEDURE — 3008F PR BODY MASS INDEX (BMI) DOCUMENTED: ICD-10-PCS | Mod: CPTII,S$GLB,, | Performed by: FAMILY MEDICINE

## 2023-10-19 PROCEDURE — 1159F PR MEDICATION LIST DOCUMENTED IN MEDICAL RECORD: ICD-10-PCS | Mod: CPTII,S$GLB,, | Performed by: FAMILY MEDICINE

## 2023-10-19 PROCEDURE — 99214 PR OFFICE/OUTPT VISIT, EST, LEVL IV, 30-39 MIN: ICD-10-PCS | Mod: S$GLB,,, | Performed by: FAMILY MEDICINE

## 2023-10-19 PROCEDURE — 3288F FALL RISK ASSESSMENT DOCD: CPT | Mod: CPTII,S$GLB,, | Performed by: FAMILY MEDICINE

## 2023-10-19 PROCEDURE — 3078F PR MOST RECENT DIASTOLIC BLOOD PRESSURE < 80 MM HG: ICD-10-PCS | Mod: CPTII,S$GLB,, | Performed by: FAMILY MEDICINE

## 2023-10-19 PROCEDURE — 1101F PT FALLS ASSESS-DOCD LE1/YR: CPT | Mod: CPTII,S$GLB,, | Performed by: FAMILY MEDICINE

## 2023-10-19 PROCEDURE — 3288F PR FALLS RISK ASSESSMENT DOCUMENTED: ICD-10-PCS | Mod: CPTII,S$GLB,, | Performed by: FAMILY MEDICINE

## 2023-10-19 PROCEDURE — 1101F PR PT FALLS ASSESS DOC 0-1 FALLS W/OUT INJ PAST YR: ICD-10-PCS | Mod: CPTII,S$GLB,, | Performed by: FAMILY MEDICINE

## 2023-10-19 PROCEDURE — 3074F PR MOST RECENT SYSTOLIC BLOOD PRESSURE < 130 MM HG: ICD-10-PCS | Mod: CPTII,S$GLB,, | Performed by: FAMILY MEDICINE

## 2023-10-19 PROCEDURE — 3008F BODY MASS INDEX DOCD: CPT | Mod: CPTII,S$GLB,, | Performed by: FAMILY MEDICINE

## 2023-10-19 PROCEDURE — 1159F MED LIST DOCD IN RCRD: CPT | Mod: CPTII,S$GLB,, | Performed by: FAMILY MEDICINE

## 2023-10-19 PROCEDURE — 3074F SYST BP LT 130 MM HG: CPT | Mod: CPTII,S$GLB,, | Performed by: FAMILY MEDICINE

## 2023-10-19 PROCEDURE — 3078F DIAST BP <80 MM HG: CPT | Mod: CPTII,S$GLB,, | Performed by: FAMILY MEDICINE

## 2023-10-19 NOTE — PROGRESS NOTES
SUBJECTIVE:    Patient ID: Nika Smith is a 68 y.o. female.    Chief Complaint: Annual Exam (Multiple joins pain, no bottles, discuss about medications, mammogram ordered, abc )    68-year-old female stays active going to [employer] 2-3 times per week.  She walks 5-7 days a week and rides a bike also.  She enjoys playing pickleball 5 days a week.  She has lost 3 lb since last visit.  She eats 2-3 meals per day.    She wears a wrist splint when she plays pickleball as her wrists ache a bit.  Toes also hurts some.  She finds Voltaren gel or frankincense oil to be helpful.    She no longer takes sertraline.  She does take Ambien 10 mg HS for sleep.   melatonin was not helpful.    2020-colonoscopy with Dr. Malhotra-RT 3 years    Hyperlipidemia-Dr. Arellano cardiology.  He is considering getting a CT calcium score on her            Office Visit on 10/19/2023   Component Date Value Ref Range Status    WBC 10/20/2023 3.7 (L)  3.8 - 10.8 Thousand/uL Final    RBC 10/20/2023 4.57  3.80 - 5.10 Million/uL Final    Hemoglobin 10/20/2023 13.9  11.7 - 15.5 g/dL Final    Hematocrit 10/20/2023 43.3  35.0 - 45.0 % Final    MCV 10/20/2023 94.7  80.0 - 100.0 fL Final    MCH 10/20/2023 30.4  27.0 - 33.0 pg Final    MCHC 10/20/2023 32.1  32.0 - 36.0 g/dL Final    RDW 10/20/2023 13.0  11.0 - 15.0 % Final    Platelets 10/20/2023 189  140 - 400 Thousand/uL Final    MPV 10/20/2023 12.4  7.5 - 12.5 fL Final    Neutrophils, Abs 10/20/2023 1,935  1,500 - 7,800 cells/uL Final    Lymph # 10/20/2023 1,369  850 - 3,900 cells/uL Final    Mono # 10/20/2023 296  200 - 950 cells/uL Final    Eos # 10/20/2023 59  15 - 500 cells/uL Final    Baso # 10/20/2023 41  0 - 200 cells/uL Final    Neutrophils Relative 10/20/2023 52.3  % Final    Lymph % 10/20/2023 37.0  % Final    Mono % 10/20/2023 8.0  % Final    Eosinophil % 10/20/2023 1.6  % Final    Basophil % 10/20/2023 1.1  % Final    Glucose 10/20/2023 90  65 - 99 mg/dL Final    BUN 10/20/2023 15  7 - 25 mg/dL  Final    Creatinine 10/20/2023 0.71  0.60 - 1.00 mg/dL Final    eGFR 10/20/2023 91  > OR = 60 mL/min/1.73m2 Final    BUN/Creatinine Ratio 10/20/2023 SEE NOTE:  6 - 22 (calc) Final    Sodium 10/20/2023 139  135 - 146 mmol/L Final    Potassium 10/20/2023 5.1  3.5 - 5.3 mmol/L Final    Chloride 10/20/2023 104  98 - 110 mmol/L Final    CO2 10/20/2023 31  20 - 32 mmol/L Final    Calcium 10/20/2023 9.6  8.6 - 10.4 mg/dL Final    Total Protein 10/20/2023 6.6  6.1 - 8.1 g/dL Final    Albumin 10/20/2023 4.2  3.6 - 5.1 g/dL Final    Globulin, Total 10/20/2023 2.4  1.9 - 3.7 g/dL (calc) Final    Albumin/Globulin Ratio 10/20/2023 1.8  1.0 - 2.5 (calc) Final    Total Bilirubin 10/20/2023 0.6  0.2 - 1.2 mg/dL Final    Alkaline Phosphatase 10/20/2023 84  37 - 153 U/L Final    AST 10/20/2023 22  10 - 35 U/L Final    ALT 10/20/2023 14  6 - 29 U/L Final    Cholesterol 10/20/2023 222 (H)  <200 mg/dL Final    HDL 10/20/2023 105  > OR = 50 mg/dL Final    Triglycerides 10/20/2023 101  <150 mg/dL Final    LDL Cholesterol 10/20/2023 98  mg/dL (calc) Final    HDL/Cholesterol Ratio 10/20/2023 2.1  <5.0 (calc) Final    Non HDL Chol. (LDL+VLDL) 10/20/2023 117  <130 mg/dL (calc) Final    Color, UA 10/20/2023 YELLOW  YELLOW Final    Appearance, UA 10/20/2023 CLEAR  CLEAR Final    Specific Gravity, UA 10/20/2023 1.018  1.001 - 1.035 Final    pH, UA 10/20/2023 8.5 (H)  5.0 - 8.0 Final    Glucose, UA 10/20/2023 NEGATIVE  NEGATIVE Final    Bilirubin, UA 10/20/2023 NEGATIVE  NEGATIVE Final    Ketones, UA 10/20/2023 NEGATIVE  NEGATIVE Final    Occult Blood UA 10/20/2023 NEGATIVE  NEGATIVE Final    Protein, UA 10/20/2023 NEGATIVE  NEGATIVE Final    Nitrite, UA 10/20/2023 NEGATIVE  NEGATIVE Final    Leukocytes, UA 10/20/2023 2+ (A)  NEGATIVE Final    WBC Casts, UA 10/20/2023 6-10 (A)  < OR = 5 /HPF Final    RBC Casts, UA 10/20/2023 NONE SEEN  < OR = 2 /HPF Final    Squam Epithel, UA 10/20/2023 NONE SEEN  < OR = 5 /HPF Final    Bacteria, UA 10/20/2023  MANY (A)  NONE SEEN /HPF Final    Hyaline Casts, UA 10/20/2023 NONE SEEN  NONE SEEN /LPF Final    Service Cmt: 10/20/2023    Final    Reflexive Urine Culture 10/20/2023    Final    Urine Culture, Routine 10/20/2023    Final    TSH w/reflex to FT4 10/20/2023 1.10  0.40 - 4.50 mIU/L Final       Past Medical History:   Diagnosis Date    Abnormal Pap smear of cervix     ASCUS favor reactive, repeat nl    Anxiety     Heart murmur Yes    History of osteopenia     hip    Insomnia     Menopause     Mitral valve prolapse     Osteoporosis Yes     Social History     Socioeconomic History    Marital status:    Tobacco Use    Smoking status: Former     Current packs/day: 0.00     Types: Cigarettes     Quit date: 1980     Years since quittin.9    Smokeless tobacco: Never   Substance and Sexual Activity    Alcohol use: Yes     Alcohol/week: 14.0 standard drinks of alcohol     Types: 14 Glasses of wine per week    Drug use: No    Sexual activity: Not Currently     Partners: Male     Social Determinants of Health     Financial Resource Strain: Low Risk  (10/16/2023)    Overall Financial Resource Strain (CARDIA)     Difficulty of Paying Living Expenses: Not hard at all   Food Insecurity: No Food Insecurity (10/16/2023)    Hunger Vital Sign     Worried About Running Out of Food in the Last Year: Never true     Ran Out of Food in the Last Year: Never true   Transportation Needs: No Transportation Needs (10/16/2023)    PRAPARE - Transportation     Lack of Transportation (Medical): No     Lack of Transportation (Non-Medical): No   Physical Activity: Sufficiently Active (10/16/2023)    Exercise Vital Sign     Days of Exercise per Week: 7 days     Minutes of Exercise per Session: 70 min   Stress: Stress Concern Present (10/16/2023)    [place] [employer] [place] [employer] - Occupational Stress Questionnaire     Feeling of Stress : To some extent   Social Connections: Unknown (10/16/2023)    Social Connection  and Isolation Panel [NHANES]     Frequency of Communication with Friends and Family: Once a week     Frequency of Social Gatherings with Friends and Family: Twice a week     Active Member of Clubs or Organizations: No     Attends Club or Organization Meetings: Never     Marital Status:    Housing Stability: Low Risk  (10/16/2023)    Housing Stability Vital Sign     Unable to Pay for Housing in the Last Year: No     Number of Places Lived in the Last Year: 1     Unstable Housing in the Last Year: No     Past Surgical History:   Procedure Laterality Date    COLONOSCOPY  2015    Dr Alfaro 5 yr    COLONOSCOPY  2020    Dr Hung parra 3 yrs    COLPOSCOPY      DILATION AND CURETTAGE OF UTERUS  2002    menorrhagia, path benign    EXCISION OF MASS OF EXTREMITY Right 2021    pseudo cyst  right leg    EYE SURGERY      Cataracts-Dr Westbrook    SURGICAL REMOVAL OF MASS OF LOWER EXTREMITY Right 9/29/2021    Procedure: EXCISION, MASS, LOWER EXTREMITY;  Surgeon: Jaison Beckman III, MD;  [facility];  Service: General;  Laterality: Right;     Family History   Problem Relation Age of Onset    Diabetes Father     Hypertension Father     Heart attack Father     Coronary artery disease Father     Cancer Father     Osteoarthritis Mother     Breast cancer Sister 65    Cancer Sister     Hypertension Brother        The 10-year CVD risk score (SEBASTIENAgostino et al., 2008) is: 5.1%    Values used to calculate the score:      Age: 68 years      Sex: Female      Diabetic: No      Tobacco smoker: No      Systolic Blood Pressure: 114 mmHg      Is BP treated: No      HDL Cholesterol: 105 mg/dL      Total Cholesterol: 222 mg/dL    All of your core healthy metrics are met.      Review of patient's allergies indicates:  No Known Allergies    Current Outpatient Medications:     calcium citrate-vitamin D3 200 mg calcium -250 unit Tab, Take 3 tablets by mouth once daily., Disp: , Rfl:     EYE ITCH RELIEF 0.025 % (0.035 %) ophthalmic  "solution, , Disp: , Rfl:     FLAXSEED OIL MISC, , Disp: , Rfl:     glucosam-MSM-chond-hyaluron ac 074--1 mg Tab, Take 2 tablets by mouth once daily., Disp: , Rfl:     mineral oil/petrolatum,white (LUBRICANT EYE OPHT), , Disp: , Rfl:     multivit with minerals/lutein (MULTIVITAMIN 50 PLUS ORAL), , Disp: , Rfl:     zolpidem (AMBIEN) 10 mg Tab, Take 1 tablet (10 mg total) by mouth every evening., Disp: 90 tablet, Rfl: 1    sertraline (ZOLOFT) 25 MG tablet, Take 1 tablet (25 mg total) by mouth every evening. (Patient not taking: Reported on 10/19/2023), Disp: 90 tablet, Rfl: 1    Review of Systems   Constitutional:  Negative for appetite change, chills, fatigue, fever and unexpected weight change.   HENT:  Negative for ear discharge and ear pain.    Eyes:  Negative for pain, discharge and visual disturbance.   Respiratory:  Negative for apnea, cough, shortness of breath and wheezing.    Cardiovascular:  Negative for chest pain, palpitations and leg swelling.   Gastrointestinal:  Negative for abdominal pain, blood in stool, constipation, diarrhea, nausea, vomiting and reflux.   Endocrine: Negative for cold intolerance, heat intolerance and polydipsia.   Genitourinary:  Negative for bladder incontinence, dysuria, hematuria, nocturia and urgency.   Musculoskeletal:  Positive for arthralgias (wrists ache due to arthritis.). Negative for gait problem, joint swelling and myalgias.   Neurological:  Negative for dizziness, seizures and numbness.   Psychiatric/Behavioral:  Positive for sleep disturbance (Ambien working well). Negative for behavioral problems and hallucinations. The patient is not nervous/anxious.            Objective:      Vitals:    10/19/23 1406   BP: 114/60   Pulse: 70   Weight: 46.3 kg (102 lb)   Height: 5' 1" (1.549 m)     Physical Exam  Vitals and nursing note reviewed.   Constitutional:       General: She is not in acute distress.     Appearance: Normal appearance. She is well-developed. She is not " toxic-appearing.   HENT:      Head: Normocephalic and atraumatic.      Right Ear: Tympanic membrane and external ear normal.      Left Ear: Tympanic membrane and external ear normal.      Nose: Nose normal.      Mouth/Throat:      Pharynx: Oropharynx is clear.   Eyes:      Pupils: Pupils are equal, round, and reactive to light.   Neck:      Thyroid: No thyromegaly.      Vascular: No carotid bruit.   Cardiovascular:      Rate and Rhythm: Normal rate and regular rhythm.      Heart sounds: Normal heart sounds. No murmur heard.  Pulmonary:      Effort: Pulmonary effort is normal.      Breath sounds: Normal breath sounds. No wheezing or rales.   Abdominal:      General: Bowel sounds are normal. There is no distension.      Palpations: Abdomen is soft.      Tenderness: There is no abdominal tenderness.   Musculoskeletal:         General: No tenderness or deformity. Normal range of motion.      Cervical back: Normal range of motion and neck supple.      Lumbar back: Normal. No spasms.      Comments: Bends 90 degrees at  waist, shoulders and knees good range of motion without crepitance.  No pitting edema to lower extremities   Lymphadenopathy:      Cervical: No cervical adenopathy.   Skin:     General: Skin is warm and dry.      Findings: No rash.   Neurological:      General: No focal deficit present.      Mental Status: She is alert and oriented to person, place, and time.      Cranial Nerves: No cranial nerve deficit.      Coordination: Coordination normal.   Psychiatric:         Behavior: Behavior normal.         Thought Content: Thought content normal.         Judgment: Judgment normal.           Assessment:       1. Pure hypercholesterolemia    2. Screening mammogram for high-risk patient    3. Encounter for imaging to assess osteoporosis    4. Menopause    5. Lumbar disc disease    6. Anxiety    7. Mitral valve prolapse syndrome    8. Osteopenia of necks of both femurs    9. Primary osteoarthritis involving multiple  joints    10. Primary insomnia    11. Mixed hyperlipidemia         Plan:       Pure hypercholesterolemia  -     CBC Auto Differential; Future; Expected date: 10/19/2023  -     Comprehensive Metabolic Panel; Future; Expected date: 10/19/2023  -     Lipid Panel; Future; Expected date: 10/19/2023  -     Urinalysis, Reflex to Urine Culture Urine, Clean Catch; Future; Expected date: 10/19/2023  -     TSH w/reflex to FT4; Future; Expected date: 10/19/2023  Will get baseline labs to see if cholesterol needs aggressive treatment  Screening mammogram for high-risk patient  -     Mammo Digital Screening Bilat; Future; Expected date: 10/19/2023  Mammogram ordered  Encounter for imaging to assess osteoporosis  -     DXA Bone Density Axial Skeleton 1 or more sites; Future; Expected date: 10/19/2023  DEXA scan ordered  Menopause  -     DXA Bone Density Axial Skeleton 1 or more sites; Future; Expected date: 10/19/2023    Lumbar disc disease    Anxiety    Mitral valve prolapse syndrome    Osteopenia of necks of both femurs  Continue calcium plus D vitamins b.i.d.  Primary osteoarthritis involving multiple joints    Primary insomnia  Continue Ambien  Mixed hyperlipidemia  Needs referral back to Dr. Malhotra for colonoscopy    Follow up in about 1 year (around 10/19/2024), or Hyperlipidemia.        10/22/2023 Celio [place]

## 2023-10-22 PROBLEM — E78.2 MIXED HYPERLIPIDEMIA: Status: ACTIVE | Noted: 2023-10-22

## 2023-10-22 LAB
ALBUMIN SERPL-MCNC: 4.2 G/DL (ref 3.6–5.1)
ALBUMIN/GLOB SERPL: 1.8 (CALC) (ref 1–2.5)
ALP SERPL-CCNC: 84 U/L (ref 37–153)
ALT SERPL-CCNC: 14 U/L (ref 6–29)
APPEARANCE UR: CLEAR
AST SERPL-CCNC: 22 U/L (ref 10–35)
BACTERIA #/AREA URNS HPF: ABNORMAL /HPF
BACTERIA UR CULT: ABNORMAL
BACTERIA UR CULT: ABNORMAL
BASOPHILS # BLD AUTO: 41 CELLS/UL (ref 0–200)
BASOPHILS NFR BLD AUTO: 1.1 %
BILIRUB SERPL-MCNC: 0.6 MG/DL (ref 0.2–1.2)
BILIRUB UR QL STRIP: NEGATIVE
BUN SERPL-MCNC: 15 MG/DL (ref 7–25)
BUN/CREAT SERPL: NORMAL (CALC) (ref 6–22)
CALCIUM SERPL-MCNC: 9.6 MG/DL (ref 8.6–10.4)
CHLORIDE SERPL-SCNC: 104 MMOL/L (ref 98–110)
CHOLEST SERPL-MCNC: 222 MG/DL
CHOLEST/HDLC SERPL: 2.1 (CALC)
CO2 SERPL-SCNC: 31 MMOL/L (ref 20–32)
COLOR UR: YELLOW
CREAT SERPL-MCNC: 0.71 MG/DL (ref 0.6–1)
EGFR: 91 ML/MIN/1.73M2
EOSINOPHIL # BLD AUTO: 59 CELLS/UL (ref 15–500)
EOSINOPHIL NFR BLD AUTO: 1.6 %
ERYTHROCYTE [DISTWIDTH] IN BLOOD BY AUTOMATED COUNT: 13 % (ref 11–15)
GLOBULIN SER CALC-MCNC: 2.4 G/DL (CALC) (ref 1.9–3.7)
GLUCOSE SERPL-MCNC: 90 MG/DL (ref 65–99)
GLUCOSE UR QL STRIP: NEGATIVE
HCT VFR BLD AUTO: 43.3 % (ref 35–45)
HDLC SERPL-MCNC: 105 MG/DL
HGB BLD-MCNC: 13.9 G/DL (ref 11.7–15.5)
HGB UR QL STRIP: NEGATIVE
HYALINE CASTS #/AREA URNS LPF: ABNORMAL /LPF
KETONES UR QL STRIP: NEGATIVE
LDLC SERPL CALC-MCNC: 98 MG/DL (CALC)
LEUKOCYTE ESTERASE UR QL STRIP: ABNORMAL
LYMPHOCYTES # BLD AUTO: 1369 CELLS/UL (ref 850–3900)
LYMPHOCYTES NFR BLD AUTO: 37 %
MCH RBC QN AUTO: 30.4 PG (ref 27–33)
MCHC RBC AUTO-ENTMCNC: 32.1 G/DL (ref 32–36)
MCV RBC AUTO: 94.7 FL (ref 80–100)
MONOCYTES # BLD AUTO: 296 CELLS/UL (ref 200–950)
MONOCYTES NFR BLD AUTO: 8 %
NEUTROPHILS # BLD AUTO: 1935 CELLS/UL (ref 1500–7800)
NEUTROPHILS NFR BLD AUTO: 52.3 %
NITRITE UR QL STRIP: NEGATIVE
NONHDLC SERPL-MCNC: 117 MG/DL (CALC)
PH UR STRIP: 8.5 [PH] (ref 5–8)
PLATELET # BLD AUTO: 189 THOUSAND/UL (ref 140–400)
PMV BLD REES-ECKER: 12.4 FL (ref 7.5–12.5)
POTASSIUM SERPL-SCNC: 5.1 MMOL/L (ref 3.5–5.3)
PROT SERPL-MCNC: 6.6 G/DL (ref 6.1–8.1)
PROT UR QL STRIP: NEGATIVE
RBC # BLD AUTO: 4.57 MILLION/UL (ref 3.8–5.1)
RBC #/AREA URNS HPF: ABNORMAL /HPF
SERVICE CMNT-IMP: ABNORMAL
SODIUM SERPL-SCNC: 139 MMOL/L (ref 135–146)
SP GR UR STRIP: 1.02 (ref 1–1.03)
SQUAMOUS #/AREA URNS HPF: ABNORMAL /HPF
TRIGL SERPL-MCNC: 101 MG/DL
TSH SERPL-ACNC: 1.1 MIU/L (ref 0.4–4.5)
WBC # BLD AUTO: 3.7 THOUSAND/UL (ref 3.8–10.8)
WBC #/AREA URNS HPF: ABNORMAL /HPF

## 2023-10-22 NOTE — PROGRESS NOTES
Call patient.  Lab work looks excellent.  CBC shows no anemia.  Sugar kidneys liver and thyroid all normal.  Cholesterol has improved  from 246 down to 222.  Continue current medications.

## 2023-10-23 ENCOUNTER — TELEPHONE (OUTPATIENT)
Dept: FAMILY MEDICINE | Facility: CLINIC | Age: 68
End: 2023-10-23

## 2023-10-23 DIAGNOSIS — Z12.11 SPECIAL SCREENING FOR MALIGNANT NEOPLASMS, COLON: Primary | ICD-10-CM

## 2023-10-23 NOTE — TELEPHONE ENCOUNTER
----- Message from Puja Taylor sent at 10/23/2023  9:34 AM CDT -----  Vm- 8:59-pt would like her last labs sent to dr. Arellano   [phone]

## 2023-10-31 ENCOUNTER — TELEPHONE (OUTPATIENT)
Dept: FAMILY MEDICINE | Facility: CLINIC | Age: 68
End: 2023-10-31

## 2023-11-16 LAB — BCS RECOMMENDATION EXT: NORMAL

## 2023-11-21 ENCOUNTER — PATIENT OUTREACH (OUTPATIENT)
Dept: ADMINISTRATIVE | Facility: HOSPITAL | Age: 68
End: 2023-11-21
Payer: MEDICARE

## 2023-11-21 NOTE — PROGRESS NOTES
Population Health Chart Review & Patient Outreach Details    Outreach Performed: NO    Additional Pop Health Notes:           Updates Requested / Reviewed:      Care Everywhere, Care Team Updated, and Removed  or Duplicate Orders         Health Maintenance Topics Overdue:    Health Maintenance Due   Topic Date Due    Hepatitis C Screening  Never done    TETANUS VACCINE  Never done    RSV Vaccine (Age 60+ and Pregnant patients) (1 - 1-dose 60+ series) Never done    COVID-19 Vaccine (2023-24 season) 2023         Health Maintenance Topic(s) Outreach Outcomes & Actions Taken:    Breast Cancer Screening - Outreach Outcomes & Actions Taken  : External Records Uploaded & Care Team Updated if Applicable       oral

## 2024-01-07 DIAGNOSIS — F51.01 PRIMARY INSOMNIA: ICD-10-CM

## 2024-01-09 RX ORDER — ZOLPIDEM TARTRATE 10 MG/1
10 TABLET ORAL NIGHTLY
Qty: 90 TABLET | Refills: 1 | Status: SHIPPED | OUTPATIENT
Start: 2024-01-09

## 2024-01-09 NOTE — TELEPHONE ENCOUNTER
Patient up to date on office visits.   Per  last filled 9/20/23.  Prescription pended for provider review.

## 2024-02-13 DIAGNOSIS — F41.9 ANXIETY: ICD-10-CM

## 2024-02-14 RX ORDER — SERTRALINE HYDROCHLORIDE 25 MG/1
25 TABLET, FILM COATED ORAL NIGHTLY
Qty: 90 TABLET | Refills: 1 | Status: SHIPPED | OUTPATIENT
Start: 2024-02-14

## 2024-02-27 DIAGNOSIS — Z00.00 ENCOUNTER FOR MEDICARE ANNUAL WELLNESS EXAM: ICD-10-CM

## 2024-04-26 ENCOUNTER — TELEPHONE (OUTPATIENT)
Dept: FAMILY MEDICINE | Facility: CLINIC | Age: 69
End: 2024-04-26
Payer: MEDICARE

## 2024-10-02 DIAGNOSIS — F51.01 PRIMARY INSOMNIA: ICD-10-CM

## 2024-10-02 RX ORDER — ZOLPIDEM TARTRATE 10 MG/1
10 TABLET ORAL NIGHTLY
Qty: 90 TABLET | Refills: 1 | Status: SHIPPED | OUTPATIENT
Start: 2024-10-02

## 2024-10-09 ENCOUNTER — TELEPHONE (OUTPATIENT)
Dept: FAMILY MEDICINE | Facility: CLINIC | Age: 69
End: 2024-10-09
Payer: MEDICARE

## 2024-10-09 DIAGNOSIS — Z79.899 ENCOUNTER FOR LONG-TERM (CURRENT) USE OF MEDICATIONS: Primary | ICD-10-CM

## 2024-10-09 DIAGNOSIS — E78.00 PURE HYPERCHOLESTEROLEMIA: ICD-10-CM

## 2024-10-09 DIAGNOSIS — Z78.0 MENOPAUSE: ICD-10-CM

## 2024-10-09 DIAGNOSIS — E78.2 MIXED HYPERLIPIDEMIA: ICD-10-CM

## 2024-10-22 ENCOUNTER — OFFICE VISIT (OUTPATIENT)
Dept: FAMILY MEDICINE | Facility: CLINIC | Age: 69
End: 2024-10-22
Attending: FAMILY MEDICINE
Payer: MEDICARE

## 2024-10-22 VITALS
HEART RATE: 60 BPM | BODY MASS INDEX: 19.07 KG/M2 | WEIGHT: 101 LBS | HEIGHT: 61 IN | DIASTOLIC BLOOD PRESSURE: 60 MMHG | SYSTOLIC BLOOD PRESSURE: 124 MMHG | OXYGEN SATURATION: 98 %

## 2024-10-22 DIAGNOSIS — F51.01 PRIMARY INSOMNIA: ICD-10-CM

## 2024-10-22 DIAGNOSIS — I34.1 MITRAL VALVE PROLAPSE SYNDROME: ICD-10-CM

## 2024-10-22 DIAGNOSIS — M23.91 INTERNAL DERANGEMENT OF MULTIPLE SITES OF RIGHT KNEE: Primary | ICD-10-CM

## 2024-10-22 DIAGNOSIS — M85.851 OSTEOPENIA OF NECKS OF BOTH FEMURS: ICD-10-CM

## 2024-10-22 DIAGNOSIS — M51.9 LUMBAR DISC DISEASE: ICD-10-CM

## 2024-10-22 DIAGNOSIS — E78.2 MIXED HYPERLIPIDEMIA: ICD-10-CM

## 2024-10-22 DIAGNOSIS — Z12.31 OTHER SCREENING MAMMOGRAM: ICD-10-CM

## 2024-10-22 DIAGNOSIS — Z78.0 MENOPAUSE: ICD-10-CM

## 2024-10-22 DIAGNOSIS — M15.0 PRIMARY OSTEOARTHRITIS INVOLVING MULTIPLE JOINTS: ICD-10-CM

## 2024-10-22 DIAGNOSIS — M85.852 OSTEOPENIA OF NECKS OF BOTH FEMURS: ICD-10-CM

## 2024-10-22 DIAGNOSIS — F41.9 ANXIETY: ICD-10-CM

## 2024-10-22 PROCEDURE — 3008F BODY MASS INDEX DOCD: CPT | Mod: CPTII,S$GLB,, | Performed by: FAMILY MEDICINE

## 2024-10-22 PROCEDURE — 1125F AMNT PAIN NOTED PAIN PRSNT: CPT | Mod: CPTII,S$GLB,, | Performed by: FAMILY MEDICINE

## 2024-10-22 PROCEDURE — 3078F DIAST BP <80 MM HG: CPT | Mod: CPTII,S$GLB,, | Performed by: FAMILY MEDICINE

## 2024-10-22 PROCEDURE — 99214 OFFICE O/P EST MOD 30 MIN: CPT | Mod: S$GLB,,, | Performed by: FAMILY MEDICINE

## 2024-10-22 PROCEDURE — 3074F SYST BP LT 130 MM HG: CPT | Mod: CPTII,S$GLB,, | Performed by: FAMILY MEDICINE

## 2024-10-22 PROCEDURE — 1159F MED LIST DOCD IN RCRD: CPT | Mod: CPTII,S$GLB,, | Performed by: FAMILY MEDICINE

## 2024-10-22 NOTE — PROGRESS NOTES
SUBJECTIVE:    Patient ID: Nika Smith is a 69 y.o. female.    Chief Complaint: Annual Exam (Upcoming MRI today for right knee pain,cough for one week, review Lab-results, mammogram//dexa ordered, abc )    HPI    History of Present Illness    CHIEF COMPLAINT:  Nika presents today for follow-up on right knee pain.    MUSCULOSKELETAL:  She reports right knee pain with associated swelling, particularly when ascending stairs or bending. She experiences mild discomfort during pickleball activities and wears a supportive sleeve for added stability. She is scheduled for an MRI to further evaluate the knee. She received a gel injection in the right knee with minimal improvement. She acknowledges a known history of arthritis in the knee. Her left knee is currently pain-free following a meniscus removal several years ago. She receives gel injections in the left knee every six months. She reports previous wrist pain, which has improved. She wears a wristband or brace while playing pickleball, which has been effective in preventing discomfort. She denies any current numbness or tingling in her fingers. Previously experienced foot pain in the bottom of her feet and toes has subsided.    PHYSICAL ACTIVITIES:  She plays pickleball regularly, experiencing mild discomfort while playing but uses a sleeve for relief. She also engages in yoga 1-2 times per week, noting that pickleball has somewhat reduced her yoga frequency.    CARDIOVASCULAR:  She experiences occasional palpitations and lightheadedness. These episodes are brief, lasting approximately 20 seconds, and resolve spontaneously when she stops her activity. She denies any prolonged or severe symptoms. She recently had a cardiology appointment with Dr. Arellano about a month ago, which reportedly yielded good results.    BONE HEALTH:  Her previous bone density scan showed a T-score of 2.2, indicating she is close to but not yet meeting the criteria for osteoporosis. She is  "scheduled for her next bone density scan in December.    SLEEP:  She takes Ambien at midnight, which provides approximately 3-3.5 hours of sleep. She experiences difficulty staying asleep, describing tossing and turning from 3 AM to 5 AM. She mentions a previous negative experience with Temazepam, stating she "did not feel good with it," but does not provide specific details about the adverse effects.    MEDICATIONS:  She takes calcium with vitamin D supplement twice daily for bone health maintenance. She occasionally uses Advil for knee pain when it is particularly bothersome, but tries to limit its use.    FAMILY HISTORY:  Her mother passed away at age 76. Mother was a heavy smoker who never quit. Her mother was involved in a severe automobile accident, after which her health progressively declined.      ROS:  Constitutional: -appetite change, -chills, -fatigue, -fever, -unexpected weight change  HENT: -ear pain, -trouble swallowing  Eyes: -pain, -discharge, -visual disturbance  Respiratory: -apnea, -cough, -shortness of breath, -wheezing  Cardiovascular: -chest pain, -leg swelling, +palpitations  Gastrointestinal: -abdominal pain, -blood in stool, -constipation, -diarrhea, -nausea, -vomiting, -reflux  Endocrine: -cold intolerance, -heat intolerance, -polydipsia  Genitourinary: -bladder incontinence, -dysuria, -erectile dysfunction, -frequency, -hematuria, -testicular pain, -urgency, -nocturia  Musculoskeletal: -gait problem, -joint swelling, -myalgia, +joint pain  Neurological: -dizziness, -seizures, -numbness  Psychiatric/Behavioral: -agitation, -hallucinations, -nervous, -anxiety symptoms  Psychiatric: +sleep difficulty         No visits with results within 6 Month(s) from this visit.   Latest known visit with results is:   Patient Outreach on 11/21/2023   Component Date Value Ref Range Status    BCS Recommendation External 11/16/2023 Repeat mammogram in 1 year   Final       Past Medical History:   Diagnosis " Date    Abnormal Pap smear of cervix     ASCUS favor reactive, repeat nl    Anxiety     Heart murmur Yes    History of osteopenia     hip    Insomnia     Menopause 2005    Mitral valve prolapse     Osteoporosis Yes     Social History     Socioeconomic History    Marital status:    Tobacco Use    Smoking status: Former     Current packs/day: 0.00     Types: Cigarettes     Quit date: 1980     Years since quittin.9    Smokeless tobacco: Never   Substance and Sexual Activity    Alcohol use: Yes     Alcohol/week: 14.0 standard drinks of alcohol     Types: 14 Glasses of wine per week    Drug use: No    Sexual activity: Not Currently     Partners: Male     Social Drivers of Health     Financial Resource Strain: Low Risk  (10/19/2024)    Overall Financial Resource Strain (CARDIA)     Difficulty of Paying Living Expenses: Not hard at all   Food Insecurity: No Food Insecurity (10/19/2024)    Hunger Vital Sign     Worried About Running Out of Food in the Last Year: Never true     Ran Out of Food in the Last Year: Never true   Transportation Needs: No Transportation Needs (2024)    Received from [facility]    [facility]     Lack of Transportation (Medical): No     Lack of Transportation (Non-Medical): No   Physical Activity: Sufficiently Active (10/19/2024)    Exercise Vital Sign     Days of Exercise per Week: 7 days     Minutes of Exercise per Session: 60 min   Stress: Stress Concern Present (10/19/2024)    [employer] - Occupational Stress Questionnaire     Feeling of Stress : Rather much   Housing Stability: Low Risk  (10/16/2023)    Housing Stability Vital Sign     Unable to Pay for Housing in the Last Year: No     Number of Places Lived in the Last Year: 1     Unstable Housing in the Last Year: No     Past Surgical History:   Procedure Laterality Date    COLONOSCOPY      Dr Alfaro 5 yr    COLONOSCOPY      Dr Hung parra 3 yrs     COLPOSCOPY      DILATION AND CURETTAGE OF UTERUS  2002    menorrhagia, path benign    EXCISION OF MASS OF EXTREMITY Right 2021    pseudo cyst  right leg    EYE SURGERY      Cataracts-Dr Westbrook    SURGICAL REMOVAL OF MASS OF LOWER EXTREMITY Right 9/29/2021    Procedure: EXCISION, MASS, LOWER EXTREMITY;  Surgeon: Jaison Beckman III, MD;  Location: [facility];  Service: General;  Laterality: Right;     Family History   Problem Relation Name Age of Onset    Diabetes Father Faustino Lanza Sr     Hypertension Father Faustino Lanza Sr     Heart attack Father Faustino Lanza Sr     Coronary artery disease Father Faustino Lanza Sr     Cancer Father Faustino Bentoniral      Osteoarthritis Mother Melyssa Lanza     Breast cancer Sister Micki Ornelas 65    Cancer Sister Micki Ornelas     Hypertension Brother Faustino Lanza Jr        The 10-year CVD risk score (Jessica et al., 2008) is: 6.6%    Values used to calculate the score:      Age: 69 years      Sex: Female      Diabetic: No      Tobacco smoker: No      Systolic Blood Pressure: 124 mmHg      Is BP treated: No      HDL Cholesterol: 105 mg/dL      Total Cholesterol: 222 mg/dL    All of your core healthy metrics are met.      Review of patient's allergies indicates:  No Known Allergies    Current Outpatient Medications:     calcium citrate-vitamin D3 200 mg calcium -250 unit Tab, Take 3 tablets by mouth once daily., Disp: , Rfl:     EYE ITCH RELIEF 0.025 % (0.035 %) ophthalmic solution, , Disp: , Rfl:     FLAXSEED OIL MISC, , Disp: , Rfl:     glucosam-MSM-chond-hyaluron ac 909--1 mg Tab, Take 2 tablets by mouth once daily., Disp: , Rfl:     mineral oil/petrolatum,white (LUBRICANT EYE OPHT), , Disp: , Rfl:     multivit with minerals/lutein (MULTIVITAMIN 50 PLUS ORAL), , Disp: , Rfl:     zolpidem (AMBIEN) 10 mg Tab, Take 1 tablet (10 mg total) by mouth every evening., Disp: 90 tablet, Rfl: 1    sertraline (ZOLOFT) 25 MG tablet, Take 1 tablet (25 mg total) by  "mouth every evening., Disp: 90 tablet, Rfl: 1    Review of Systems   Constitutional:  Negative for activity change, appetite change, chills, fatigue, fever and unexpected weight change.   HENT:  Positive for hearing loss. Negative for ear discharge, ear pain, rhinorrhea and trouble swallowing.    Eyes:  Negative for pain, discharge and visual disturbance.   Respiratory:  Negative for apnea, cough, chest tightness, shortness of breath and wheezing.    Cardiovascular:  Negative for chest pain, palpitations and leg swelling.   Gastrointestinal:  Negative for abdominal pain, blood in stool, constipation, diarrhea, nausea, vomiting and reflux.   Endocrine: Negative for cold intolerance, heat intolerance, polydipsia and polyuria.   Genitourinary:  Negative for bladder incontinence, difficulty urinating, dysuria, hematuria, menstrual problem, nocturia and urgency.   Musculoskeletal:  Positive for arthralgias and joint swelling. Negative for gait problem, myalgias and neck pain.   Neurological:  Negative for dizziness, seizures, weakness, numbness and headaches.   Psychiatric/Behavioral:  Negative for behavioral problems, confusion, dysphoric mood and hallucinations. The patient is not nervous/anxious.            Objective:      Vitals:    10/22/24 0831   BP: 124/60   Pulse: 60   SpO2: 98%   Weight: 45.8 kg (101 lb)   Height: 5' 1" (1.549 m)     Physical Exam  Vitals and nursing note reviewed.   Constitutional:       General: She is not in acute distress.     Appearance: Normal appearance. She is well-developed. She is not toxic-appearing.   HENT:      Head: Normocephalic and atraumatic.      Right Ear: Tympanic membrane and external ear normal.      Left Ear: Tympanic membrane and external ear normal.      Nose: Nose normal.      Mouth/Throat:      Pharynx: Oropharynx is clear. No posterior oropharyngeal erythema.   Eyes:      Pupils: Pupils are equal, round, and reactive to light.   Neck:      Thyroid: No thyromegaly.     "  Vascular: No carotid bruit.   Cardiovascular:      Rate and Rhythm: Normal rate and regular rhythm.      Heart sounds: Normal heart sounds. No murmur heard.  Pulmonary:      Effort: Pulmonary effort is normal.      Breath sounds: Normal breath sounds. No wheezing or rales.   Abdominal:      General: Bowel sounds are normal. There is no distension.      Palpations: Abdomen is soft.      Tenderness: There is no abdominal tenderness.   Musculoskeletal:         General: No tenderness or deformity. Normal range of motion.      Cervical back: Normal range of motion and neck supple.      Lumbar back: Normal. No spasms.      Comments: Bends 90 degrees at  waist, shoulders and knees have full range of motion, no pitting edema to lower extremities, small fluid collection behind right popliteal space compatible with small Baker's cyst., knees are crepitant bilaterally   Lymphadenopathy:      Cervical: No cervical adenopathy.   Skin:     General: Skin is warm and dry.      Findings: No rash.   Neurological:      General: No focal deficit present.      Mental Status: She is alert and oriented to person, place, and time. Mental status is at baseline.      Cranial Nerves: No cranial nerve deficit.      Coordination: Coordination normal.      Gait: Gait normal.   Psychiatric:         Mood and Affect: Mood normal.         Behavior: Behavior normal.         Thought Content: Thought content normal.         Judgment: Judgment normal.       Physical Exam    HENT: Tympanic membranes normal bilaterally. External ears normal bilaterally. Oropharynx is clear. No posterior oropharyngeal erythema.  Pulmonary: Pulmonary effort is normal. Normal breath sounds. No wheezing. No rales.  Musculoskeletal: Crooked index finger.  MSK: Knee - Right: Mild fluid behind right knee.             Assessment:       1. Internal derangement of multiple sites of right knee    2. Other screening mammogram    3. Menopause    4. Lumbar disc disease    5. Anxiety     6. Mixed hyperlipidemia    7. Mitral valve prolapse syndrome    8. Primary osteoarthritis involving multiple joints    9. Osteopenia of necks of both femurs    10. Primary insomnia         Plan:       Internal derangement of multiple sites of right knee  MRI scheduled for the right knee later on today.  Orthopedic Dr. Marco Rodriguez following.  Other screening mammogram  -     Mammo Digital Screening Bilat; Future; Expected date: 10/22/2024  Mammogram ordered  Menopause  -     DXA Bone Density Axial Skeleton 1 or more sites; Future; Expected date: 10/22/2024  DEXA scan due in December  Lumbar disc disease  Stable at this time  Anxiety  No issues  Hyperlipidemia-  Last cholesterol 1 year ago was 222 with a HDL of 105  LDL 98, needs a lipid panel this month.    Mitral valve prolapse syndrome  Stable per Dr. Arellano  Primary osteoarthritis involving multiple joints  Rarely uses Advil  Osteopenia of necks of both femurs  Calcium plus D vitamins b.i.d.  Primary insomnia  Ambien only getting her 3 hours of sleep, offered lorazepam or trazodone has an option.  She will research this and let us know if she would like to try this.    Assessment & Plan    Evaluated right knee pain, swelling, and limited mobility  Reviewed recent bloodwork: normal sugar, kidney function, potassium, and calcium levels  Noted excellent vitamin D levels  Discussed sleep issues; patient currently using Ambien with limited effectiveness  Considered osteoporosis management; awaiting bone density scan results in December  Assessed cardiovascular health; patient recently cleared by Dr. Arellano  Examined both knees, noting crepitus and mild effusion in right knee    KNEE OSTEOARTHRITIS:  - Explained crepitus in knees as roughening of cartilage surfaces.  - Nika to continue wearing knee sleeve during pickleball as needed.  - Nika to maintain current level of physical activity, including pickleball and yoga.  - Continued occasional use of Advil for  knee pain as needed.  - MRI scheduled today with Dr. Rodriguez for right knee evaluation.    HYPERLIPIDEMIA:  - Clarified that elevated HDL cholesterol is beneficial.    COVID-19:  - Provided information on COVID-19 vaccine as an optional preventive measure.    INSOMNIA:  - Recommend researching alternative sleep medications (lorazepam, trazodone) if interested in trying something new.  - Continued Ambien for sleep, taken at 12 AM.    OSTEOPOROSIS PREVENTION:  - Discussed that blood calcium levels do not necessarily reflect bone calcium content.  - Continued calcium with vitamin D supplement 2 times daily.    LABS:  - Ordered fasting labs: cholesterol panel, CBC, thyroid function test.    FOLLOW UP:  - Follow up in 1 year unless needed sooner.  - Contact office when lab work is completed.         Follow up in about 1 year (around 10/22/2025), or Osteoarthritis, insomnia.        This note was generated with the assistance of ambient listening technology. Verbal consent was obtained by the patient and accompanying visitor(s) for the recording of patient appointment to facilitate this note. I attest to having reviewed and edited the generated note for accuracy, though some syntax or spelling errors may persist. Please contact the author of this note for any clarification.      10/22/2024 Celio Birmingham

## 2024-10-28 ENCOUNTER — TELEPHONE (OUTPATIENT)
Dept: FAMILY MEDICINE | Facility: CLINIC | Age: 69
End: 2024-10-28
Payer: MEDICARE

## 2024-11-18 ENCOUNTER — HOSPITAL ENCOUNTER (OUTPATIENT)
Dept: RADIOLOGY | Facility: HOSPITAL | Age: 69
Discharge: HOME | End: 2024-11-18
Attending: FAMILY MEDICINE
Payer: MEDICARE

## 2024-11-18 VITALS — BODY MASS INDEX: 19.07 KG/M2 | HEIGHT: 61 IN | WEIGHT: 101 LBS

## 2024-11-18 DIAGNOSIS — Z12.31 OTHER SCREENING MAMMOGRAM: ICD-10-CM

## 2024-11-18 PROCEDURE — 77067 SCR MAMMO BI INCL CAD: CPT | Mod: TC,PO

## 2024-12-17 ENCOUNTER — HOSPITAL ENCOUNTER (OUTPATIENT)
Dept: RADIOLOGY | Facility: HOSPITAL | Age: 69
Discharge: HOME | End: 2024-12-17
Attending: FAMILY MEDICINE
Payer: MEDICARE

## 2024-12-17 DIAGNOSIS — Z78.0 MENOPAUSE: ICD-10-CM

## 2024-12-17 PROCEDURE — 77080 DXA BONE DENSITY AXIAL: CPT | Mod: TC,PO

## 2024-12-17 PROCEDURE — 77080 DXA BONE DENSITY AXIAL: CPT | Mod: 26,,, | Performed by: RADIOLOGY

## 2024-12-26 ENCOUNTER — TELEPHONE (OUTPATIENT)
Dept: FAMILY MEDICINE | Facility: CLINIC | Age: 69
End: 2024-12-26
Payer: MEDICARE

## 2024-12-26 NOTE — TELEPHONE ENCOUNTER
----- Message from [place] sent at 12/26/2024 11:12 AM CST -----  The patient is returning Zo's call pt's # [phone] GH

## 2024-12-26 NOTE — TELEPHONE ENCOUNTER
----- Message from Celio Birmingham MD sent at 12/23/2024  8:28 PM CST -----  Call patient.  DEXA scan shows osteopenia of the bones.  Treatment includes taking calcium plus D vitamins twice a day.  Recheck DEXA scan in 2 years

## 2024-12-30 RX ORDER — PSYLLIUM HUSK 0.4 G
600 CAPSULE ORAL 2 TIMES DAILY WITH MEALS
COMMUNITY

## 2024-12-30 RX ORDER — CHOLECALCIFEROL (VITAMIN D3) 25 MCG
1000 TABLET ORAL 2 TIMES DAILY
COMMUNITY

## 2025-01-02 NOTE — TELEPHONE ENCOUNTER
Spoke to patient with results from Dr Birmingham. Said she saw this on portal and will check the message I sent her for exact dosing of calcium and vitamin D

## 2025-01-28 ENCOUNTER — TELEPHONE (OUTPATIENT)
Dept: FAMILY MEDICINE | Facility: CLINIC | Age: 70
End: 2025-01-28
Payer: MEDICARE

## 2025-01-28 NOTE — TELEPHONE ENCOUNTER
----- Message from Herb Denton sent at 1/28/2025  8:03 AM CST -----    ----- Message -----  From: Izzy Larry LPN  Sent: 1/28/2025  12:00 AM CST  To: Celio Birmingham Staff    ---- Message from Celio Birmingham MD sent at 10/27/2024  7:52 PM CDT -----  Call patient.  Her cholesterol has risen up to 240 with the LDL up to 110.  Triglycerides normal at 131 CBC shows no anemia.  Thyroid levels are normal.  I would recommend trying a small dose of rosuvastatin 5 mg Monday Wednesday Friday.  We really need to get your cholesterol under 200 to be safe.  Recheck CMP lipids again in 3-6 months

## 2025-02-24 DIAGNOSIS — Z00.00 ENCOUNTER FOR MEDICARE ANNUAL WELLNESS EXAM: ICD-10-CM

## 2025-04-01 DIAGNOSIS — F51.01 PRIMARY INSOMNIA: ICD-10-CM

## 2025-04-01 RX ORDER — ZOLPIDEM TARTRATE 10 MG/1
10 TABLET ORAL NIGHTLY
Qty: 90 TABLET | Refills: 1 | Status: SHIPPED | OUTPATIENT
Start: 2025-04-01

## 2025-04-17 ENCOUNTER — TELEPHONE (OUTPATIENT)
Dept: FAMILY MEDICINE | Facility: CLINIC | Age: 70
End: 2025-04-17
Payer: MEDICARE

## 2025-04-17 NOTE — TELEPHONE ENCOUNTER
----- Message from Aarti sent at 4/17/2025  8:05 AM CDT -----  Pt wants an appt with Dr. Birmingham to discuss bone density test and wether she needs to be on medication. Pt #[phone]

## 2025-04-22 ENCOUNTER — OFFICE VISIT (OUTPATIENT)
Dept: FAMILY MEDICINE | Facility: CLINIC | Age: 70
End: 2025-04-22
Payer: MEDICARE

## 2025-04-22 VITALS
DIASTOLIC BLOOD PRESSURE: 58 MMHG | SYSTOLIC BLOOD PRESSURE: 102 MMHG | WEIGHT: 100.38 LBS | HEART RATE: 66 BPM | OXYGEN SATURATION: 95 % | HEIGHT: 61 IN | BODY MASS INDEX: 18.95 KG/M2

## 2025-04-22 DIAGNOSIS — I34.1 MITRAL VALVE PROLAPSE SYNDROME: ICD-10-CM

## 2025-04-22 DIAGNOSIS — E78.2 MIXED HYPERLIPIDEMIA: Primary | ICD-10-CM

## 2025-04-22 DIAGNOSIS — Z78.0 MENOPAUSE: ICD-10-CM

## 2025-04-22 DIAGNOSIS — M51.9 LUMBAR DISC DISEASE: ICD-10-CM

## 2025-04-22 DIAGNOSIS — F41.9 ANXIETY: ICD-10-CM

## 2025-04-22 DIAGNOSIS — M85.852 OSTEOPENIA OF NECKS OF BOTH FEMURS: ICD-10-CM

## 2025-04-22 DIAGNOSIS — M85.851 OSTEOPENIA OF NECKS OF BOTH FEMURS: ICD-10-CM

## 2025-04-22 DIAGNOSIS — F51.01 PRIMARY INSOMNIA: ICD-10-CM

## 2025-04-22 DIAGNOSIS — M15.0 PRIMARY OSTEOARTHRITIS INVOLVING MULTIPLE JOINTS: ICD-10-CM

## 2025-04-22 PROCEDURE — 99214 OFFICE O/P EST MOD 30 MIN: CPT | Mod: S$GLB,,, | Performed by: FAMILY MEDICINE

## 2025-04-22 PROCEDURE — 1101F PT FALLS ASSESS-DOCD LE1/YR: CPT | Mod: CPTII,S$GLB,, | Performed by: FAMILY MEDICINE

## 2025-04-22 PROCEDURE — 1126F AMNT PAIN NOTED NONE PRSNT: CPT | Mod: CPTII,S$GLB,, | Performed by: FAMILY MEDICINE

## 2025-04-22 PROCEDURE — 1159F MED LIST DOCD IN RCRD: CPT | Mod: CPTII,S$GLB,, | Performed by: FAMILY MEDICINE

## 2025-04-22 PROCEDURE — 3008F BODY MASS INDEX DOCD: CPT | Mod: CPTII,S$GLB,, | Performed by: FAMILY MEDICINE

## 2025-04-22 PROCEDURE — 3288F FALL RISK ASSESSMENT DOCD: CPT | Mod: CPTII,S$GLB,, | Performed by: FAMILY MEDICINE

## 2025-04-22 PROCEDURE — 3074F SYST BP LT 130 MM HG: CPT | Mod: CPTII,S$GLB,, | Performed by: FAMILY MEDICINE

## 2025-04-22 PROCEDURE — 3078F DIAST BP <80 MM HG: CPT | Mod: CPTII,S$GLB,, | Performed by: FAMILY MEDICINE

## 2025-04-22 RX ORDER — ROSUVASTATIN CALCIUM 5 MG/1
1 TABLET, COATED ORAL DAILY
COMMUNITY
Start: 2025-02-11

## 2025-04-24 NOTE — PROGRESS NOTES
SUBJECTIVE:    Patient ID: Nika Smith is a 70 y.o. female.    Chief Complaint: Follow-up (Bottle brought//Pt is here for a check up and to discuss the rosuvastatin medication//AYANA )    Follow-up  Pertinent negatives include no arthralgias, chest pain, headaches, joint swelling, neck pain, vomiting or weakness.       History of Present Illness    CHIEF COMPLAINT:  Nika presents today for follow up regarding cholesterol medication and fatigue    HYPERLIPIDEMIA:  She reports cholesterol improvement from 240 to 200 after starting rosuvastatin 5 mg daily for approximately six weeks. She notes decreased appetite since starting statin medication, though maintains regular food intake.    SLEEP:  She takes sleeping medication and reports getting approximately 3.5 hours of initial sleep before waking up around midnight.    CARDIOVASCULAR:  Recent calcium score was 47, placing her in the mild category. CT Scan showed a small amount of calcium buildup in the right coronary artery and a minimal amount in the left anterior descending artery. She experiences occasional palpitations and lightheadedness, which do not last long but have been occurring more frequently lately. She has a history of mitral valve prolapse, confirmed by echocardiogram performed a few years ago. She denies chest pain.    MUSCULOSKELETAL:  She had meniscus surgery in November and reports re-tearing it shortly after. She has no cartilage on one side of the knee, resulting in bone-on-bone contact. She reports difficulty with stairs, squats, and playing pickleball, specifically with stopping, starting, and stepping hard on the affected knee. She cannot run like previously but notes improved walking ability since having the knee drained.    EXERCISE:  She plays pickleball three days a week, reduced from five days previously. She maintains a daily exercise routine, either walking or riding her bike. She practices yoga once weekly. When cycling, she rides  for at least an hour, covering 10-12 miles. During walks, she typically covers about 3.5 miles in approximately an hour.    BONE HEALTH:  Bone density improved from -2.2 to -1.9 based on December test. She currently takes calcium and Vitamin D supplements. She expresses concern about conflicting information from orthopedic specialists who suggested osteoporosis based on x-ray findings, which differs from her bone density scan results.    FAMILY HISTORY:  Mother  at age 76 due to complications from automobile accident, with history of COPD and arthritis. Father  at age 83 from congestive heart failure with history of diabetes, prostate cancer, colon cancer, high cholesterol, and hypertension. Two sisters with osteoporosis, youngest receiving annual injections and older sister on Prolia. No family history of bone fractures or postural changes associated with osteoporosis.      ROS:  Constitutional: +appetite change, -chills, +fatigue, -fever, -unexpected weight change  HENT: -ear pain, -trouble swallowing  Eyes: -pain, -discharge, -visual disturbance  Respiratory: -apnea, -cough, -shortness of breath, -wheezing  Cardiovascular: -chest pain, -leg swelling, +palpitations  Gastrointestinal: -abdominal pain, -blood in stool, -constipation, -diarrhea, -nausea, -vomiting, -reflux, +loss of appetite  Endocrine: -cold intolerance, -heat intolerance, -polydipsia  Genitourinary: -bladder incontinence, -dysuria, -erectile dysfunction, -frequency, -hematuria, -testicular pain, -urgency, -nocturia  Musculoskeletal: -gait problem, -joint swelling, -myalgia, +pain with movement, +limb pain, +lower extremity pain with movement  Neurological: -dizziness, -seizures, -numbness, +lightheadedness, +sleep difficulty  Psychiatric/Behavioral: -agitation, -hallucinations, -nervous, -anxiety symptoms         No visits with results within 6 Month(s) from this visit.   Latest known visit with results is:   Telephone on 10/09/2024    Component Date Value Ref Range Status    Cholesterol 10/23/2024 240 (H)  <200 mg/dL Final    HDL 10/23/2024 105  > OR = 50 mg/dL Final    Triglycerides 10/23/2024 131  <150 mg/dL Final    LDL Cholesterol 10/23/2024 110 (H)  mg/dL (calc) Final    HDL/Cholesterol Ratio 10/23/2024 2.3  <5.0 (calc) Final    Non HDL Chol. (LDL+VLDL) 10/23/2024 135 (H)  <130 mg/dL (calc) Final    WBC 10/23/2024 7.1  3.8 - 10.8 Thousand/uL Final    RBC 10/23/2024 4.91  3.80 - 5.10 Million/uL Final    Hemoglobin 10/23/2024 15.2  11.7 - 15.5 g/dL Final    Hematocrit 10/23/2024 47.5 (H)  35.0 - 45.0 % Final    MCV 10/23/2024 96.7  80.0 - 100.0 fL Final    MCH 10/23/2024 31.0  27.0 - 33.0 pg Final    MCHC 10/23/2024 32.0  32.0 - 36.0 g/dL Final    RDW 10/23/2024 12.3  11.0 - 15.0 % Final    Platelets 10/23/2024 188  140 - 400 Thousand/uL Final    MPV 10/23/2024 12.7 (H)  7.5 - 12.5 fL Final    Neutrophils, Abs 10/23/2024 5,098  1,500 - 7,800 cells/uL Final    Lymph # 10/23/2024 1,335  850 - 3,900 cells/uL Final    Mono # 10/23/2024 518  200 - 950 cells/uL Final    Eos # 10/23/2024 78  15 - 500 cells/uL Final    Baso # 10/23/2024 71  0 - 200 cells/uL Final    Neutrophils Relative 10/23/2024 71.8  % Final    Lymph % 10/23/2024 18.8  % Final    Mono % 10/23/2024 7.3  % Final    Eosinophil % 10/23/2024 1.1  % Final    Basophil % 10/23/2024 1.0  % Final    TSH w/reflex to FT4 10/23/2024 1.25  0.40 - 4.50 mIU/L Final       Past Medical History:   Diagnosis Date    Abnormal Pap smear of cervix 1989    ASCUS favor reactive, repeat nl    Anxiety     Heart murmur Yes    History of osteopenia     hip    Insomnia     Menopause 2005    Mitral valve prolapse     Osteoporosis Yes     Social History[1]  Past Surgical History:   Procedure Laterality Date    COLONOSCOPY  2015    Dr Alfaro 5 yr    COLONOSCOPY  2020    Dr Hung parra 3 yrs    COLPOSCOPY      DILATION AND CURETTAGE OF UTERUS  2002    menorrhagia, path benign    EXCISION OF MASS OF  EXTREMITY Right 2021    pseudo cyst  right leg    EYE SURGERY      Cataracts-Dr Westbrook    SURGICAL REMOVAL OF MASS OF LOWER EXTREMITY Right 9/29/2021    Procedure: EXCISION, MASS, LOWER EXTREMITY;  Surgeon: Jaison Beckman III, MD;  Location: [facility];  Service: General;  Laterality: Right;     Family History   Problem Relation Name Age of Onset    Diabetes Father Faustino Lanza Sr     Hypertension Father Faustino Lanza Sr     Heart attack Father Faustino Lanza Sr     Coronary artery disease Father Faustino Lanza Sr     Cancer Father Faustino Lanza Sr     Osteoarthritis Mother Melyssa Lanza     Breast cancer Sister Micki Ornelas 65    Cancer Sister Micki Ornelas     Hypertension Brother Faustino Lanza Jr        The 10-year CVD risk score (Jessica, et al., 2008) is: 4.4%    Values used to calculate the score:      Age: 70 years      Sex: Female      Diabetic: No      Tobacco smoker: No      Systolic Blood Pressure: 102 mmHg      Is BP treated: No      HDL Cholesterol: 105 mg/dL      Total Cholesterol: 240 mg/dL    All of your core healthy metrics are met.      Review of patient's allergies indicates:  No Known Allergies  Current Medications[2]    Review of Systems   Constitutional:  Positive for activity change. Negative for unexpected weight change.   HENT:  Negative for hearing loss, rhinorrhea and trouble swallowing.    Eyes:  Negative for discharge and visual disturbance.   Respiratory:  Negative for chest tightness and wheezing.    Cardiovascular:  Negative for chest pain and palpitations.   Gastrointestinal:  Negative for blood in stool, constipation, diarrhea and vomiting.   Endocrine: Negative for polydipsia and polyuria.   Genitourinary:  Negative for difficulty urinating, dysuria, hematuria and menstrual problem.   Musculoskeletal:  Negative for arthralgias, joint swelling and neck pain.   Neurological:  Negative for weakness and headaches.   Psychiatric/Behavioral:  Negative for confusion  "and dysphoric mood.            Objective:      Vitals:    04/22/25 1137   BP: (!) 102/58   Pulse: 66   SpO2: 95%   Weight: 45.5 kg (100 lb 6.4 oz)   Height: 5' 1" (1.549 m)     Physical Exam  Vitals and nursing note reviewed.   Constitutional:       General: She is not in acute distress.     Appearance: Normal appearance. She is well-developed. She is not toxic-appearing.      Comments: Thin female in no apparent distress   HENT:      Head: Normocephalic and atraumatic.      Right Ear: Tympanic membrane and external ear normal.      Left Ear: Tympanic membrane and external ear normal.      Nose: Nose normal.      Mouth/Throat:      Pharynx: Oropharynx is clear. No posterior oropharyngeal erythema.   Eyes:      Pupils: Pupils are equal, round, and reactive to light.   Neck:      Thyroid: No thyromegaly.      Vascular: No carotid bruit.   Cardiovascular:      Rate and Rhythm: Normal rate and regular rhythm.      Heart sounds: Normal heart sounds. No murmur heard.  Pulmonary:      Effort: Pulmonary effort is normal.      Breath sounds: Normal breath sounds. No wheezing or rales.   Abdominal:      General: Bowel sounds are normal. There is no distension.      Palpations: Abdomen is soft.      Tenderness: There is no abdominal tenderness.   Musculoskeletal:         General: No tenderness or deformity. Normal range of motion.      Cervical back: Normal range of motion and neck supple.      Lumbar back: Normal. No spasms.      Comments: Bends 90 degrees at  waist, shoulders and knees have full range of motion, no pitting edema to lower extremities   Lymphadenopathy:      Cervical: No cervical adenopathy.   Skin:     General: Skin is warm and dry.      Findings: No rash.   Neurological:      General: No focal deficit present.      Mental Status: She is alert and oriented to person, place, and time. Mental status is at baseline.      Cranial Nerves: No cranial nerve deficit.      Coordination: Coordination normal. "   Psychiatric:         Mood and Affect: Mood normal.         Behavior: Behavior normal.         Thought Content: Thought content normal.         Judgment: Judgment normal.       Physical Exam    Neck: No carotid bruit.  Cardiovascular: Normal rate and regular rhythm. Normal heart sounds. No murmur heard.             Assessment:       1. Mixed hyperlipidemia    2. Lumbar disc disease    3. Mitral valve prolapse syndrome    4. Anxiety    5. Menopause    6. Osteopenia of necks of both femurs    7. Primary osteoarthritis involving multiple joints    8. Primary insomnia         Plan:       Mixed hyperlipidemia  Cholesterol has dropped from 240 down to 200 HDL has improved to 105.  LDL 78.  She is tolerating rosuvastatin 5 mg nightly without much myalgia  Lumbar disc disease  Very active with her sports.  Mitral valve prolapse syndrome  Cardiac calcium score was 47.4 cardiologist not wanting to workup this further  Anxiety    Menopause  No hot flashes.  Osteopenia of necks of both femurs  Calcium plus D vitamins b.i.d. recommended  Primary osteoarthritis involving multiple joints  Seeing Dr. Shay Nunn for gel injections for her knee.  Orthopedics recommended  a TKR.  Primary insomnia  Doing well with zolpidem 10 mg HS    Assessment & Plan    E78.5 Hyperlipidemia, unspecified  I25.84 Coronary atherosclerosis due to calcified coronary lesion  I34.1 Nonrheumatic mitral (valve) prolapse  R00.2 Palpitations  R42 Dizziness and giddiness  G47.9 Sleep disorder, unspecified  M23.332 Other meniscus derangements, other medial meniscus, left knee  M17.0 Bilateral primary osteoarthritis of knee  M85.88 Other specified disorders of bone density and structure, other site    IMPRESSION:  - Reviewed cardiac calcium score of 47, indicating mild calcification primarily in the right coronary artery.  - Assessed cholesterol levels: Total cholesterol decreased from 240 to 200 after 6 weeks on rosuvastatin; LDL reduced from 110-130 to 78.  -  Evaluated bone density results: Lumbar spine normal, hips show osteopenia.  - Considered high activity level and family history in treatment decisions.  - Determined to continue conservative management of osteopenia given normal spine density and absence of fracture risk factors.  - Assessed knee condition and agreed with orthopedist's recommendation for gel injections before considering knee replacement.    HYPERLIPIDEMIA:  - Discussed the relationship between statins and muscle pain/fatigue with the patient.  - Continued rosuvastatin 5 mg daily for cholesterol management.  - Noted improvement in the patient's cholesterol levels from 240 to 200 after starting rosuvastatin.  - Observed decrease in LDL (bad cholesterol) from 110-130 to 78.  - Evaluated the patient's cholesterol levels, noting improvement with statin use and high HDL (good cholesterol).  - Assessed the need for continued statin use based on cholesterol improvement and calcium score.  - Continued rosuvastatin 5mg daily to manage cholesterol and prevent further coronary calcification.    CORONARY ATHEROSCLEROSIS:  - Explained the significance of cardiac calcium score and its implications for heart health to the patient.  - Reviewed results of the cardiac calcium score test, which showed mild calcification with a score of 47.  - Evaluated the calcium score, noting it's in the mild category with some blockage in the right coronary artery.  - Assessed the need for statin therapy based on the calcium score and cholesterol levels.    MITRAL VALVE PROLAPSE:  - Noted the patient's history of mitral valve prolapse, confirmed by an echocardiogram performed 2 years ago.  - Confirmed the presence of mitral valve prolapse without any other significant cardiac issues.    PALPITATIONS AND DIZZINESS:  - Noted the patient's report of experiencing occasional palpitations and lightheadedness, which have been occurring more frequently lately.  - Acknowledged the patient's  report of experiencing occasional lightheadedness along with palpitations.    SLEEP DISORDER:  - Continued current sleep medication.  - Noted the patient reports getting about 3.5 hours of sleep with the prescribed sleeping pill.  - Evaluated the effectiveness of the sleeping pill, noting it provides about 3.5 hours of sleep without causing morning grogginess.  - Assessed the patient's sleep pattern and the effectiveness of the current sleep medication.    KNEE DERANGEMENT AND OSTEOARTHRITIS:  - Noted the patient had meniscus surgery in November and subsequently re-tore it.  - Acknowledged the patient's report of difficulty with stairs, squats, and playing pickleball.  - Performed X-rays to compare both knees and evaluate the condition.  - Assessed the need for knee replacem  ent based on the patient's current level of pain and activity.  - Recommend gel shots for the knee, with potential for knee replacement if the gel shots are ineffective.  - Reviewed risks and benefits of knee replacement surgery with the patient.  - Noted the patient reports knee pain, difficulty with stairs, squats, and playing pickleball, with the left knee more severely affected.  - Performed X-rays to compare both knees, showing a difference between the left and right knee.  - Assessed the need for knee replacement based on the patient's current level of pain and activity in both knees.  - Recommend gel shots for the knee, with potential for knee replacement if the gel shots are ineffective.    OSTEOPENIA:  - Continued calcium and vitamin D supplements for bone health.  - Scheduled follow up in 2 years for DEXA scan to reassess osteopenia.  - Noted improvement in the patient's bone density from -2.2 to -1.9 in the most recent DEXA scan in December.  - Evaluated DEXA scan results showing normal lumbar spine density and osteopenia in the hips.  - Assessed the current treatment plan and discussed potential aggressive treatments if needed in  the future.  - Recommend continuing current treatment with calcium, vitamin D supplements, and weight-bearing exercises.    GENERAL HEALTH RECOMMENDATIONS:  - Explained importance of adequate nutrition and ensuring adequate caloric and protein intake for active individuals.  - Nika to continue current level of physical activity, including pickleball, walking, and biking.  - Nika to maintain weight-bearing exercises to support bone health.         Follow up in about 6 months (around 10/22/2025), or hld.        This note was generated with the assistance of ambient listening technology. Verbal consent was obtained by the patient and accompanying visitor(s) for the recording of patient appointment to facilitate this note. I attest to having reviewed and edited the generated note for accuracy, though some syntax or spelling errors may persist. Please contact the author of this note for any clarification.      [address]           [1]   Social History  Socioeconomic History    Marital status:    Tobacco Use    Smoking status: Former     Current packs/day: 0.00     Types: Cigarettes     Quit date: 1980     Years since quittin.4    Smokeless tobacco: Never   Substance and Sexual Activity    Alcohol use: Yes     Alcohol/week: 14.0 standard drinks of alcohol     Types: 14 Glasses of wine per week    Drug use: No    Sexual activity: Not Currently     Partners: Male     Social Drivers of Health     Financial Resource Strain: Low Risk  (10/19/2024)    Overall Financial Resource Strain (CARDIA)     Difficulty of Paying Living Expenses: Not hard at all   Food Insecurity: No Food Insecurity (10/19/2024)    Hunger Vital Sign     Worried About Running Out of Food in the Last Year: Never true     Ran Out of Food in the Last Year: Never true   Transportation Needs: No Transportation Needs (2024)    Received from [facility]    PRAPARE - Transportation     Lack of Transportation (Medical): No      Lack of Transportation (Non-Medical): No   Physical Activity: Sufficiently Active (10/19/2024)    Exercise Vital Sign     Days of Exercise per Week: 7 days     Minutes of Exercise per Session: 60 min   Stress: Stress Concern Present (10/19/2024)    [employer] - Occupational Stress Questionnaire     Feeling of Stress : Rather much   Housing Stability: Low Risk  (10/16/2023)    Housing Stability Vital Sign     Unable to Pay for Housing in the Last Year: No     Number of Places Lived in the Last Year: 1     Unstable Housing in the Last Year: No   [2]   Current Outpatient Medications:     rosuvastatin (CRESTOR) 5 MG tablet, Take 1 tablet by mouth once daily., Disp: , Rfl:     calcium carbonate (OS-MANDEEP) 600 mg calcium (1,500 mg) Tab, Take 600 mg by mouth 2 (two) times daily with meals., Disp: , Rfl:     calcium citrate-vitamin D3 200 mg calcium -250 unit Tab, Take 3 tablets by mouth once daily., Disp: , Rfl:     EYE ITCH RELIEF 0.025 % (0.035 %) ophthalmic solution, , Disp: , Rfl:     FLAXSEED OIL MISC, , Disp: , Rfl:     glucosam-MSM-chond-hyaluron ac 730--1 mg Tab, Take 2 tablets by mouth once daily., Disp: , Rfl:     mineral oil/petrolatum,white (LUBRICANT EYE OPHT), , Disp: , Rfl:     multivit with minerals/lutein (MULTIVITAMIN 50 PLUS ORAL), , Disp: , Rfl:     vitamin D (VITAMIN D3) 1000 units Tab, Take 1,000 Units by mouth 2 (two) times a day., Disp: , Rfl:     zolpidem (AMBIEN) 10 mg Tab, Take 1 tablet (10 mg total) by mouth every evening., Disp: 90 tablet, Rfl: 1

## 2025-06-30 DIAGNOSIS — F51.01 PRIMARY INSOMNIA: ICD-10-CM

## 2025-06-30 RX ORDER — ZOLPIDEM TARTRATE 10 MG/1
10 TABLET ORAL NIGHTLY
Qty: 90 TABLET | Refills: 1 | Status: SHIPPED | OUTPATIENT
Start: 2025-06-30

## (undated) DEVICE — SOLUTION IRRI NS BOTTLE 1000ML R5200-01

## (undated) DEVICE — TRAY GENERAL SURGERY

## (undated) DEVICE — DRESSING POST OP MEPILEX AG 4X6  498300

## (undated) DEVICE — UNDERGLOVE BIOGEL PI MICRO BLUE SZ 8

## (undated) DEVICE — COVER LIGHT HANDLE LB53

## (undated) DEVICE — SUTURE MONOCRYL 4-0 PS-2 27 MCP426H

## (undated) DEVICE — GLOVE BIOGEL PI ULTRA TOUCH GRAY SZ7.5

## (undated) DEVICE — PAD BOVIE ADULT

## (undated) DEVICE — SPONGE GAUZE 2S 4X4 STERILE NON21424

## (undated) DEVICE — SYRINGE HYPODERMC LL 22G 1.5 ECLIPSE 30

## (undated) DEVICE — DRAPE LAPAROTOMY TRANSVERSE 89281